# Patient Record
Sex: MALE | Race: WHITE | NOT HISPANIC OR LATINO | Employment: UNEMPLOYED | URBAN - METROPOLITAN AREA
[De-identification: names, ages, dates, MRNs, and addresses within clinical notes are randomized per-mention and may not be internally consistent; named-entity substitution may affect disease eponyms.]

---

## 2017-03-10 ENCOUNTER — MYC MEDICAL ADVICE (OUTPATIENT)
Dept: INTERNAL MEDICINE | Facility: CLINIC | Age: 27
End: 2017-03-10

## 2017-03-10 DIAGNOSIS — R91.1 LUNG NODULE: Primary | ICD-10-CM

## 2017-03-21 ENCOUNTER — HOSPITAL ENCOUNTER (OUTPATIENT)
Dept: GENERAL RADIOLOGY | Facility: CLINIC | Age: 27
Discharge: HOME | End: 2017-03-21
Attending: INTERNAL MEDICINE | Admitting: INTERNAL MEDICINE
Payer: COMMERCIAL

## 2017-03-21 DIAGNOSIS — R91.1 LUNG NODULE: ICD-10-CM

## 2017-03-21 DIAGNOSIS — R91.8 PULMONARY NODULES: Primary | ICD-10-CM

## 2017-03-21 PROCEDURE — 71020 XR CHEST 2 VW: CPT

## 2017-09-22 ASSESSMENT — ENCOUNTER SYMPTOMS
NECK PAIN: 0
NERVOUS/ANXIOUS: 1
BACK PAIN: 1
JOINT SWELLING: 0
MYALGIAS: 0
DEPRESSION: 1
INSOMNIA: 0
PANIC: 0
MUSCLE CRAMPS: 0
ARTHRALGIAS: 0
MUSCLE WEAKNESS: 0
STIFFNESS: 0
DECREASED CONCENTRATION: 1

## 2017-10-06 ENCOUNTER — OFFICE VISIT (OUTPATIENT)
Dept: INTERNAL MEDICINE | Facility: CLINIC | Age: 27
End: 2017-10-06

## 2017-10-06 VITALS
SYSTOLIC BLOOD PRESSURE: 119 MMHG | DIASTOLIC BLOOD PRESSURE: 74 MMHG | HEART RATE: 72 BPM | TEMPERATURE: 97.7 F | BODY MASS INDEX: 33.18 KG/M2 | HEIGHT: 76 IN | WEIGHT: 272.5 LBS | OXYGEN SATURATION: 97 % | RESPIRATION RATE: 18 BRPM

## 2017-10-06 DIAGNOSIS — Z23 NEED FOR PROPHYLACTIC VACCINATION AND INOCULATION AGAINST INFLUENZA: ICD-10-CM

## 2017-10-06 DIAGNOSIS — F41.9 ANXIETY: ICD-10-CM

## 2017-10-06 DIAGNOSIS — Z23 NEED FOR PROPHYLACTIC VACCINATION WITH TETANUS-DIPHTHERIA (TD): ICD-10-CM

## 2017-10-06 DIAGNOSIS — F41.9 ANXIETY: Primary | ICD-10-CM

## 2017-10-06 LAB
ANION GAP SERPL CALCULATED.3IONS-SCNC: 5 MMOL/L (ref 3–14)
BUN SERPL-MCNC: 14 MG/DL (ref 7–30)
CALCIUM SERPL-MCNC: 8.4 MG/DL (ref 8.5–10.1)
CHLORIDE SERPL-SCNC: 106 MMOL/L (ref 94–109)
CO2 SERPL-SCNC: 28 MMOL/L (ref 20–32)
CREAT SERPL-MCNC: 1.11 MG/DL (ref 0.66–1.25)
GFR SERPL CREATININE-BSD FRML MDRD: 79 ML/MIN/1.7M2
GLUCOSE SERPL-MCNC: 104 MG/DL (ref 70–99)
POTASSIUM SERPL-SCNC: 3.9 MMOL/L (ref 3.4–5.3)
SODIUM SERPL-SCNC: 139 MMOL/L (ref 133–144)

## 2017-10-06 RX ORDER — BUPROPION HYDROCHLORIDE 300 MG/1
300 TABLET ORAL DAILY
Qty: 90 TABLET | Refills: 3 | Status: SHIPPED | OUTPATIENT
Start: 2017-10-06 | End: 2018-11-26

## 2017-10-06 ASSESSMENT — PAIN SCALES - GENERAL: PAINLEVEL: NO PAIN (0)

## 2017-10-06 NOTE — NURSING NOTE
Chief Complaint   Patient presents with     Physical     Here for annual physical exam     Stefan Pederson CMA (AAMA) at 8:10 AM on 10/6/2017

## 2017-10-06 NOTE — NURSING NOTE
"Injectable Influenza Immunization Documentation    1.  Has the patient received the information for the injectable influenza vaccine? YES     2. Is the patient 6 months of age or older? YES     3. Does the patient have any of the following contraindications?         Severe allergy to eggs? No     Severe allergic reaction to previous influenza vaccines? No   Severe allergy to latex? No       History of Guillain-[place] syndrome? No     Currently have a temperature greater than 100.4F? No        4.  Severely egg allergic patients should have flu vaccine eligibility assessed by an MD, RN, or pharmacist, and those who received flu vaccine should be observed for 15 min by an MD, RN, Pharmacist, Medical Technician, or member of clinic staff.\": YES    5. Latex-allergic patients should be given latex-free influenza vaccine Yes. Please reference the Vaccine latex table to determine if your clinic s product is latex-containing.       Administered Influenza Fluzone Quadrivalent and TDaP Vaccine (see Immunizations in Chart Review). Patient tolerated well.        Stefan Pederson CMA at 8:35 AM on 10/6/2017       "

## 2017-10-06 NOTE — MR AVS SNAPSHOT
After Visit Summary   10/6/2017    Wicho Morocho    MRN: [id number]           Patient Information     Date Of Birth          1990        Visit Information        Provider Department      10/6/2017 8:00 AM Yarelis Steele MD [facility]        Today's Diagnoses     Anxiety    -  1    Need for prophylactic vaccination and inoculation against influenza        Need for prophylactic vaccination with tetanus-diphtheria (TD)           Follow-ups after your visit        Future tests that were ordered for you today     Open Future Orders        Priority Expected Expires Ordered    Basic metabolic panel Routine 10/6/2017 10/20/2017 10/6/2017            Who to contact     Please call your clinic at [phone] to:    Ask questions about your health    Make or cancel appointments    Discuss your medicines    Learn about your test results    Speak to your doctor   If you have compliments or concerns about an experience at your clinic, or if you wish to file a complaint, please contact [facility] Patient Relations at [phone] or email us at [email or web address]         Additional Information About Your Visit        MyChart Information     Navent gives you secure access to your electronic health record. If you see a primary care provider, you can also send messages to your care team and make appointments. If you have questions, please call your primary care clinic.  If you do not have a primary care provider, please call [phone] and they will assist you.      Navent is an electronic gateway that provides easy, online access to your medical records. With Navent, you can request a clinic appointment, read your test results, renew a prescription or communicate with your care team.     To access your existing account, please contact your [facility] or call [phone] for assistance.        Care EveryWhere  "ID     This is your Care EveryWhere ID. This could be used by other organizations to access your [place] medical records  [id number]        Your Vitals Were     Pulse Temperature Respirations Height Pulse Oximetry BMI (Body Mass Index)    72 97.7  F (36.5  C) (Oral) 18 1.93 m (6' 4\") 97% 33.17 kg/m2       Blood Pressure from Last 3 Encounters:   10/06/17 119/74   02/03/16 124/77   04/23/15 126/72    Weight from Last 3 Encounters:   10/06/17 123.6 kg (272 lb 8 oz)   02/03/16 123.8 kg (273 lb)   10/12/15 117.4 kg (258 lb 14.4 oz)              We Performed the Following     FLU VACCINE, 3 YRS +, IM  [25436]     TDAP ( BOOSTRIX AGES 10-64)          Today's Medication Changes          These changes are accurate as of: 10/6/17  8:26 AM.  If you have any questions, ask your nurse or doctor.               These medicines have changed or have updated prescriptions.        Dose/Directions    buPROPion 300 MG 24 hr tablet   Commonly known as:  WELLBUTRIN XL   This may have changed:    - medication strength  - how to take this   Used for:  Anxiety   Changed by:  Yarelis Steele MD        Dose:  300 mg   Take 1 tablet (300 mg) by mouth daily   Quantity:  90 tablet   Refills:  3            Where to get your medicines      These medications were sent to [facility]/pharmacy #[id number] - [place], [place] - [address] [place] [address] AT [facility]  [address] [place] [address]     Phone:  [phone]     buPROPion 300 MG 24 hr tablet                Primary Care Provider Office Phone # Fax #    Yarelis Steele [phone]       [address] [place] [address] [address] [place] [id number]        Equal Access to Services     [facility] AISHA : Diana Alarcon, waaxda luqadaha, qaybta kaalmada isaac, merritt monique. So [facility] [phone].    ATENCIÓN: Si habla español, tiene a eason disposición servicios gratuitos de asistencia lingüística. Llame al " [phone].    We comply with applicable federal civil rights laws and [place] laws. We do not discriminate on the basis of race, color, national origin, age, disability, sex, sexual orientation, or gender identity.            Thank you!     Thank you for choosing [facility]  for your care. Our goal is always to provide you with excellent care. Hearing back from our patients is one way we can continue to improve our services. Please take a few minutes to complete the written survey that you may receive in the mail after your visit with us. Thank you!             Your Updated Medication List - Protect others around you: Learn how to safely use, store and throw away your medicines at www.disposemymeds.org.          This list is accurate as of: 10/6/17  8:26 AM.  Always use your most recent med list.                   Brand Name Dispense Instructions for use Diagnosis    buPROPion 300 MG 24 hr tablet    WELLBUTRIN XL    90 tablet    Take 1 tablet (300 mg) by mouth daily    Anxiety       TYLENOL PO      Take by mouth as needed for mild pain or fever

## 2017-10-06 NOTE — PROGRESS NOTES
"Chief complaint:  Wicho Morocho is a 27 year old male presents for   Chief Complaint   Patient presents with     Physical     Here for annual physical exam        SUBJECTIVE:  He has been doing well.  Had serial CXRs which showed stable pulmonary nodules, so no further imaging recommended at this point.    His oral prosthesis continues to fit well and has no issues.    He is hoping that I will take over the prescribing of his welbutrin.  Reports his mood is doing well with the current regimen.    Not getting a lot of exercise.  Is hoping to increase this.    Sexually active with his girlfriend who is a 2nd year medical student.    Medications and allergies were reviewed by me today.     SocHx:   History   Smoking Status     Never Smoker   Smokeless Tobacco     Never Used     Comment: Rarely       PHQ-2 ( 1999 Pfizer) 4/23/2015 4/23/2015   Q1: Little interest or pleasure in doing things 1 1   Q2: Feeling down, depressed or hopeless 1 1   PHQ-2 Score 2 2     No flowsheet data found.      Patient Active Problem List   Diagnosis     ADD (attention deficit disorder)     Sarcoma (H)     Pulmonary nodules     Family history of colon cancer     Family history of hemophilia A       Review Of Systems   10 point ROS negative except as per HPI    PE:  /74 (BP Location: Right arm, Patient Position: Chair, Cuff Size: Adult Regular)  Pulse 72  Temp 97.7  F (36.5  C) (Oral)  Resp 18  Ht 1.93 m (6' 4\")  Wt 123.6 kg (272 lb 8 oz)  SpO2 97%  BMI 33.17 kg/m2  Gen: no distress, comfortable, pleasant   Eyes: anicteric, normal extra-ocular movements   Cardiovascular: regular rate and rhythm, normal S1 and S2, no murmurs, rubs or gallops, peripheral pulses full and symmetric   Respiratory: clear to auscultation, no wheezes or crackles, normal breath sounds   Gastrointestinal: positive bowel sounds, nontender, nondistended  Skin: no concerning lesions, no jaundice   Psychological: appropriate mood "       ASSESSMENT/PLAN:    Need for prophylactic vaccination and inoculation against influenza  - FLU VACCINE, 3 YRS +, IM  [76756]    Need for prophylactic vaccination with tetanus-diphtheria (TD)  - TDAP ( BOOSTRIX AGES 10-64)    Anxiety  Will take over prescribing.  Mood has been stable  - buPROPion (WELLBUTRIN XL) 300 MG 24 hr tablet  Dispense: 90 tablet; Refill: 3  - Basic metabolic panel      HM:  As above    RTC: 1 year    Yarelis Steele MD

## 2017-12-27 ENCOUNTER — MYC REFILL (OUTPATIENT)
Dept: INTERNAL MEDICINE | Facility: CLINIC | Age: 27
End: 2017-12-27

## 2017-12-27 DIAGNOSIS — F41.9 ANXIETY: ICD-10-CM

## 2017-12-27 RX ORDER — BUPROPION HYDROCHLORIDE 300 MG/1
300 TABLET ORAL DAILY
Qty: 90 TABLET | Refills: 3 | Status: CANCELLED | OUTPATIENT
Start: 2017-12-27

## 2018-05-02 ENCOUNTER — MYC REFILL (OUTPATIENT)
Dept: INTERNAL MEDICINE | Facility: CLINIC | Age: 28
End: 2018-05-02

## 2018-05-02 DIAGNOSIS — F41.9 ANXIETY: ICD-10-CM

## 2018-05-02 RX ORDER — BUPROPION HYDROCHLORIDE 300 MG/1
300 TABLET ORAL DAILY
Qty: 90 TABLET | Refills: 3 | Status: CANCELLED | OUTPATIENT
Start: 2018-05-02

## 2018-11-26 DIAGNOSIS — F41.9 ANXIETY: ICD-10-CM

## 2018-11-28 RX ORDER — BUPROPION HYDROCHLORIDE 300 MG/1
300 TABLET ORAL DAILY
Qty: 90 TABLET | Refills: 0 | Status: SHIPPED | OUTPATIENT
Start: 2018-11-28 | End: 2023-07-19

## 2018-11-28 NOTE — TELEPHONE ENCOUNTER
Called patient, no answered. Left message with clinic number to call back to schedule annual physical appointment with PCP in the Primary Care Clinic.

## 2018-11-28 NOTE — TELEPHONE ENCOUNTER
Last Clinic Visit: 10/6/17  NV: NONE    90 DAY RF  Scheduling has been notified to contact the pt for appointment.

## 2020-03-10 ENCOUNTER — HEALTH MAINTENANCE LETTER (OUTPATIENT)
Age: 30
End: 2020-03-10

## 2020-12-20 ENCOUNTER — HEALTH MAINTENANCE LETTER (OUTPATIENT)
Age: 30
End: 2020-12-20

## 2021-04-24 ENCOUNTER — HEALTH MAINTENANCE LETTER (OUTPATIENT)
Age: 31
End: 2021-04-24

## 2021-10-03 ENCOUNTER — HEALTH MAINTENANCE LETTER (OUTPATIENT)
Age: 31
End: 2021-10-03

## 2022-05-15 ENCOUNTER — HEALTH MAINTENANCE LETTER (OUTPATIENT)
Age: 32
End: 2022-05-15

## 2022-09-10 ENCOUNTER — HEALTH MAINTENANCE LETTER (OUTPATIENT)
Age: 32
End: 2022-09-10

## 2023-04-30 ENCOUNTER — HEALTH MAINTENANCE LETTER (OUTPATIENT)
Age: 33
End: 2023-04-30

## 2023-07-19 ENCOUNTER — HOSPITAL ENCOUNTER (EMERGENCY)
Facility: CLINIC | Age: 33
Discharge: HOME | End: 2023-07-19
Attending: EMERGENCY MEDICINE | Admitting: EMERGENCY MEDICINE

## 2023-07-19 VITALS
TEMPERATURE: 98.2 F | WEIGHT: 261.5 LBS | DIASTOLIC BLOOD PRESSURE: 69 MMHG | SYSTOLIC BLOOD PRESSURE: 117 MMHG | BODY MASS INDEX: 31.84 KG/M2 | OXYGEN SATURATION: 97 % | HEART RATE: 61 BPM | HEIGHT: 76 IN | RESPIRATION RATE: 18 BRPM

## 2023-07-19 DIAGNOSIS — R45.89 EMOTIONAL DYSREGULATION: ICD-10-CM

## 2023-07-19 DIAGNOSIS — F33.1 MAJOR DEPRESSIVE DISORDER, RECURRENT EPISODE, MODERATE (H): ICD-10-CM

## 2023-07-19 DIAGNOSIS — F41.1 GENERALIZED ANXIETY DISORDER: ICD-10-CM

## 2023-07-19 PROCEDURE — 90791 PSYCH DIAGNOSTIC EVALUATION: CPT

## 2023-07-19 PROCEDURE — 99285 EMERGENCY DEPT VISIT HI MDM: CPT | Mod: 25

## 2023-07-19 PROCEDURE — 250N000013 HC RX MED GY IP 250 OP 250 PS 637: Performed by: EMERGENCY MEDICINE

## 2023-07-19 PROCEDURE — 99284 EMERGENCY DEPT VISIT MOD MDM: CPT | Performed by: NURSE PRACTITIONER

## 2023-07-19 RX ORDER — CITALOPRAM HYDROBROMIDE 40 MG/1
40 TABLET ORAL DAILY
Qty: 30 TABLET | Refills: 0 | Status: SHIPPED | OUTPATIENT
Start: 2023-07-19 | End: 2023-12-21

## 2023-07-19 RX ORDER — CITALOPRAM HYDROBROMIDE 20 MG/1
20 TABLET ORAL DAILY
COMMUNITY
Start: 2022-12-06 | End: 2023-07-19

## 2023-07-19 RX ORDER — BUPROPION HYDROCHLORIDE 150 MG/1
150 TABLET ORAL EVERY MORNING
COMMUNITY
End: 2023-10-13

## 2023-07-19 RX ORDER — DIAZEPAM 5 MG
5 TABLET ORAL ONCE
Status: COMPLETED | OUTPATIENT
Start: 2023-07-19 | End: 2023-07-19

## 2023-07-19 RX ADMIN — DIAZEPAM 5 MG: 5 TABLET ORAL at 19:03

## 2023-07-19 ASSESSMENT — COLUMBIA-SUICIDE SEVERITY RATING SCALE - C-SSRS
TOTAL  NUMBER OF PREPARATORY ACTS PAST 3 MONTHS: 1
TOTAL  NUMBER OF PREPARATORY ACTS LIFETIME: 1
6. HAVE YOU EVER DONE ANYTHING, STARTED TO DO ANYTHING, OR PREPARED TO DO ANYTHING TO END YOUR LIFE?: YES
1. IN YOUR LIFETIME, HAVE YOU WISHED YOU WERE DEAD OR WISHED YOU COULD GO TO SLEEP AND NOT WAKE UP?: PASSIVE THOUGHTS
1. IN THE PAST MONTH, HAVE YOU WISHED YOU WERE DEAD OR WISHED YOU COULD GO TO SLEEP AND NOT WAKE UP?: PASSIVE THOUGHTS
TOTAL  NUMBER OF ABORTED OR SELF INTERRUPTED ATTEMPTS LIFETIME: NO
TOTAL  NUMBER OF INTERRUPTED ATTEMPTS LIFETIME: NO
2. HAVE YOU ACTUALLY HAD ANY THOUGHTS OF KILLING YOURSELF?: NO
6. HAVE YOU EVER DONE ANYTHING, STARTED TO DO ANYTHING, OR PREPARED TO DO ANYTHING TO END YOUR LIFE?: YES
ATTEMPT LIFETIME: NO
REASONS FOR IDEATION PAST MONTH: EQUALLY TO GET ATTENTION, REVENGE, OR A REACTION FROM OTHERS AND TO END/STOP THE PAIN
1. HAVE YOU WISHED YOU WERE DEAD OR WISHED YOU COULD GO TO SLEEP AND NOT WAKE UP?: YES
1. IN THE PAST MONTH, HAVE YOU WISHED YOU WERE DEAD OR WISHED YOU COULD GO TO SLEEP AND NOT WAKE UP?: YES
REASONS FOR IDEATION LIFETIME: EQUALLY TO GET ATTENTION, REVENGE, OR A REACTION FROM OTHERS AND TO END/STOP THE PAIN

## 2023-07-19 ASSESSMENT — ACTIVITIES OF DAILY LIVING (ADL)
ADLS_ACUITY_SCORE: 35
ADLS_ACUITY_SCORE: 35

## 2023-07-19 NOTE — ED PROVIDER NOTES
History     Chief Complaint:  Suicidal ideation     HPI   Wicho Morocho is a 32 year old male with a history of depression who presents for suicidal ideation. He was on the roof of a parking ramp when he called his wife to tell her that he was going to jump off. He has been under increased stress lately, and is not currently seeing a psychiatrist or therapist. He is still taking his psych medications. He denies self-harm, drug use, or alcohol use.      Independent Historian:   None - Patient Only    Review of External Notes:   I reviewed previous pulmonology and family medicine notes.      Medications:    Wellbutrin  Prozac  Celexa    Past Medical History:    ROBERT  Pre-diabetes  Depression  Anxiety  Fatty liver  Hypertriglyceridemia  ADD  Osteosarcoma    Past Surgical History:    Osteotomy, Maxilla, Left  Intraoral lesion excision  [place] teeth extraction     Physical Exam     Patient Vitals for the past 24 hrs:   BP Temp Temp src Pulse Resp SpO2   07/19/23 1820 (!) 139/91 (!) 96.7  F (35.9  C) Temporal 92 16 98 %        Physical Exam  General: Alert, interactive in mild distress  Head:  Scalp is atraumatic  Eyes:  The pupils are equal, round, and reactive to light    EOM's intact    No scleral icterus  ENT:      Nose:  The external nose is normal  Ears:  External ears are normal  Mouth/Throat: The oropharynx is normal    Mucus membranes are moist.      Neck:  Normal range of motion.      There is no rigidity.    Trachea is in the midline         CV:  Regular rate and rhythm    No murmur   Resp:  Breath sounds are clear bilaterally    Non-labored, no retractions or accessory muscle use      GI:  Abdomen is soft, no distension, no tenderness.       MS:  Normal strength in all 4 extremities, No midline cervical, thoracic, or lumbar tenderness  Skin:  Warm and dry, No rash or lesions noted.  Neuro:      Strength 5/5 x4.  Sensation intact  In all 4 extremities.       Cranial nerves 2-12 intact.    GCS:  15  Psych:  Awake. Alert.  Normal affect.      Appropriate interactions.    Emergency Department Course     Interventions:  Medications   diazepam (VALIUM) tablet 5 mg (has no administration in time range)        Assessments:  1850 I obtained history and examined the patient, as noted above.    Independent Interpretation (X-rays, CTs, rhythm strip):  None    Consultations/Discussion of Management or Tests:  The patient will be evaluated by our mental health team in our empath unit    Social Determinants of Health affecting care:   Stress/Adjustment Disorders    Disposition:  The patient was transferred to [facility].     Impression & Plan      Medical Decision Making:  Following presentation history and physical examination were performed, patient presents with an acute stress response, depression and suicidal ideation.  He has no chronic underlying medical conditions, there is no signs of an acute ingestion or toxidrome.  I believe he would benefit from further evaluation by her mental health professionals in our empath unit and I find him to be medically stable for further evaluation in this unit.  Patient is in agreement this plan of action.    Diagnosis:    ICD-10-CM    1. Depression with suicidal ideation  F32.A     R45.851            Scribe Disclosure:  I, Dano Retana, am serving as a scribe at 6:43 PM on 7/19/2023 to document services personally performed by Gavin Robles MD based on my observations and the provider's statements to me.      Gavin Robles MD  07/20/23 [id number]

## 2023-07-19 NOTE — ED TRIAGE NOTES
Pt from [place], recently moved here. Called his wife, states he is suicidal and was on the top of [facility] parking ramp and wanted to jump. Pt wife called dominique REAL found pt in his car on the top of [facility] parking ramp facing [address]. Pt sobbing, cooperative

## 2023-07-20 NOTE — CONSULTS
"Diagnostic Evaluation Consultation  Crisis Assessment    Patient was assessed: In Person  Patient location: declocations: EMPATH  Was a release of information signed: No. Reason: no providers to list, but pt signed a verbal LAYLA for wife      Referral Data and Chief Complaint  Wicho is a 32 year old, who uses he/him pronouns, and presents to the ED via police. Patient is referred to the ED by self. Patient is presenting to the ED for the following concerns: panic attack.      Informed Consent and Assessment Methods     Patient is his own guardian. Writer met with patient and explained the crisis assessment process, including applicable information disclosures and limits to confidentiality, assessed understanding of the process, and obtained consent to proceed with the assessment. Patient was observed to be able to participate in the assessment as evidenced by consent and engagement. Assessment methods included conducting a formal interview with patient, review of medical records, collaboration with medical staff, and obtaining relevant collateral information from family and community providers when available..     Over the course of this crisis assessment provided reassurance, offered validation, engaged patient in problem solving and disposition planning, worked with patient on safety and aftercare planning, assisted in processing patient's thoughts and feeling relating to stressors and provided psychoeducation. Patient's response to interventions was receptive.     Summary of Patient Situation  Patient stated that he was at his in-laws' house earlier today when he had an argument with his wife. He stated that he has been feel stressed out by family for the last four months. He stated that he \"couldn't take it anymore\" and \"stormed out of the house.\" He stated that he intended to be seen at this ED for his \"panic attack\" and because he \"wanted to talk to someone.\" However, once he got to the parking ramp, he \"felt " "embarassed.\" He had texted his wife from the parking ramp questing if he should jump. His wife called the police, and patient was brought to the ED for a psychiatric evaluation.    At the time of assessment, patient denied active or recent suicidal ideation Pt denied having a plan or intent to attempt suicide. He stated that he panicked due to feeling overwhelmed with stressors at home. He did endorse passive SI for the last \"few years,\" which he described as thoughts such as \"it would be nice to make it all go away.\"       Brief Psychosocial History  Patient is originally from [place], but has moved around for the past eight years for his wife's career. They recently moved back to [place] two weeks ago from [place], where they lived for three years. Pt lives at home with his wife and their 17 month old son, Luis Miguel. He stated that he has friends and family in the state and feels good to be back. However, he stated that he has been feeling \"out of place.\" Pt stated that his wife is a doctor that would soon start working at this ED. He reported a \"reginald relationship\" with his wife for the last 3 months as she is stressed out as well. 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 Pt earned his Bachelor's degree in computer science. He has not worked in the last 3 years.  He enjoys playing video games and board games.     Significant Clinical History  Patient reported previous mental health diagnoses of depression and anxiety. He denied history of inpatient hospitalizations. He used to see a psychiatrist in [place], but has not established a provider in [place] yet due to lack of insurance. He currently takes Celexa and Wellbutrin as prescribed. He stated that he has not had medication adjustments \"for a few years.\" Pt reported a history of therapy, but not currently. He stated that he plans to begin marriage counseling soon. He stated that he is not interested in individual psychotherapy at this " "time, stating, \"I don't like talking about myself.\" Pt reported a history of marijuana used to self-medicate, but denies recent use.      Collateral Information  The following information was received from Gina Rivera whose relationship to the patient is spouse. Information was obtained via phone. Their phone number is  and they last had contact with patient on today.     What happened today: She reports that today they got into a small what seemed typical marital argument and he got really angry and left the house.  He began sending text messages to her, over 50 of them in 2 min and then pictures of the top of the parking ramp at [facility] asking if he should jump.  She called PD.     What is different about patient's functioning: She notes that he has a long hx of depression and anxiety.  She denies any previous hospitalizations, IOP/PHP.  She reports that she just completed medical school/residancy and they moved back to [place] from [place].  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  She reports that its been several years since she has seen mental health providers as he is \"not a talker\".  She reports that he has been getting medication support from PCP.  She reports that lately he has seemed more depressed the last couple of months.  She wonders if this is more than anxiety/social anxiety; if possible ASD.     Concern about alcohol/drug use: No  Remote hx of THC.     What do you think the patient needs: medication adjustment, resources     Has patient made comments about wanting to kill themselves/others:  Yes She reports that during anger outbursts he has made suicidal statements but typically calms.  She denies any previous gestures/attempts/self harm.  Denies any access to guns.     If d/c is recommended, can they take part in safety/aftercare planning: Yes .     Other information: She reports that he is not currently covered under " insurance but they will cash pay until their new insurance begins likely 9/1           Domonique Thomas [facility]         Risk Assessment  [place] Suicide Severity Rating Scale Full Clinical Version:7/19/2023  Suicidal Ideation  1. Wish to be Dead (Lifetime): Yes  Wish to be Dead Description (Lifetime): passive thoughts  1. Wish to be Dead (Past 1 Month): Yes  Wish to be Dead Description (Past 1 Month): passive thoughts  2. Non-Specific Active Suicidal Thoughts (Lifetime): No  Intensity of Ideation  Most Severe Ideation Rating (Lifetime): 1  Description of Most Severe Ideation (Lifetime): passive thoughts  Most Severe Ideation Rating (Past 1 Month): 1  Description of Most Severe Ideation (Past 1 Month): passive thoughts  Frequency (Lifetime): Less than once a week  Frequency (Past 1 Month): Less than once a week  Duration (Lifetime): Fleeting, few seconds or minutes  Duration (Past 1 Month): Fleeting, few seconds or minutes  Controllability (Lifetime): Easily able to control thoughts  Controllability (Past 1 Month): Can control thoughts with little difficulty  Deterrents (Lifetime): Deterrents definitely stopped you from attempting suicide  Deterrents (Past 1 Month): Deterrents definitely stopped you from attempting suicide  Reasons for Ideation (Lifetime): Equally to get attention, revenge, or a reaction from others and to end/stop the pain  Reasons for Ideation (Past 1 Month): Equally to get attention, revenge, or a reaction from others and to end/stop the pain  Suicidal Behavior  Actual Attempt (Lifetime): No  Has subject engaged in non-suicidal self-injurious behavior? (Lifetime): No  Interrupted Attempts (Lifetime): No  Aborted or Self-Interrupted Attempt (Lifetime): No  Preparatory Acts or Behavior (Lifetime): Yes  Total Number of Preparatory Acts (Lifetime): 1  Preparatory Acts or Behavior (Past 3 Months): Yes  Total Number of Preparatory Acts (Past 3 Months): 1  Preparatory Acts or Behavior Description (Past 3  "Months): texts about jumping off a parking ramp to his wife  C-SSRS Risk (Lifetime/Recent)  Calculated C-SSRS Risk Score (Lifetime/Recent): High Risk        Validity of evaluation is not impacted by presenting factors during interview .   Comments regarding subjective versus objective responses to [place] tool: Pt is mood congruent.  Environmental or Psychosocial Events: loss of status/respect/rank, challenging interpersonal relationships, barriers to accessing healthcare, impulsivity/recklessness, unemployment/underemployment, neither working nor attending school and recent life events: recent move to [place]  Chronic Risk Factors: other: emotion dysregulation   Warning Signs: talking or writing about death, dying, or suicide, withdrawing from friends, family, and society and anxiety, agitation, unable to sleep, sleeping all the time  Protective Factors: strong bond to family unit, community support, or employment, responsibilities and duties to others, including pets and children, lives in a responsibly safe and stable environment, sense of importance of health and wellness, help seeking, optimistic outlook - identification of future goals, constructive use of leisure time, enjoyable activities, resilience and reality testing ability  Interpretation of Risk Scoring, Risk Mitigation Interventions and Safety Plan:  Patient has a high risk score due to recent Preparatory Acts or Behavior. At the time of assessment, patient denied active or recent suicidal ideation. Pt denied having a plan or intent to attempt suicide. He stated that he panicked due to feeling overwhelmed with stressors at home. He did endorse passive SI for the last \"few years,\" which he described as thoughts such as \"it would be nice to make it all go away.\" Further risk would be mitigated by following up with a therapist and medication provider.            Does the patient have thoughts of harming others? No     Is the patient engaging in sexually " "inappropriate behavior?  no        Current Substance Abuse     Is there recent substance abuse? no     Was a urine drug screen or blood alcohol level obtained: No       Mental Status Exam     Affect: Constricted   Appearance: Appropriate    Attention Span/Concentration: Attentive  Eye Contact: Variable   Fund of Knowledge: Appropriate    Language /Speech Content: Fluent   Language /Speech Volume: Normal    Language /Speech Rate/Productions: Normal    Recent Memory: Intact   Remote Memory: Intact   Mood: Anxious    Orientation to Person: Yes    Orientation to Place: Yes   Orientation to Time of Day: Yes    Orientation to Date: Yes    Situation (Do they understand why they are here?): Yes    Psychomotor Behavior: Normal    Thought Content: Clear   Thought Form: Goal Directed      History of commitment: No         Medication    Psychotropic medications: Yes. Pt is currently taking Celexa and Wellbutrin. Medication compliant: Yes. Recent medication changes: No     No current facility-administered medications for this encounter.     Current Outpatient Medications   Medication     buPROPion (WELLBUTRIN XL) 150 MG 24 hr tablet     citalopram (CELEXA) 40 MG tablet     Acetaminophen (TYLENOL PO)          Current Care Team    Primary Care Provider: No  Psychiatrist: No  Therapist: No  : No     CTSS or ARMHS: No  ACT Team: No  Other: No      Diagnosis    300.00 (F41.9) Unspecified Anxiety Disorder   311 (F32.9) Unspecified Depressive Disorder  - by history       Clinical Summary and Substantiation of Recommendations    He stated that he intended to be seen at this ED for his \"panic attack\" and because he \"wanted to talk to someone\" about psychosocial stressors. However, once he got to the parking ramp, he \"felt embarassed.\" He had texted his wife from the parking ramp questing if he should jump. His wife called the police, and patient was brought to the ED for a psychiatric evaluation.  Patient has a high risk " "score due to recent Preparatory Acts or Behavior. At the time of assessment, patient denied active or recent suicidal ideation. Pt denied having a plan or intent to attempt suicide. He stated that he panicked due to feeling overwhelmed with stressors at home. He did endorse passive SI for the last \"few years,\" which he described as thoughts such as \"it would be nice to make it all go away.\" Further risk would be mitigated by following up with a therapist and medication provider. Pt was given OP resources as he does not have insurance yet.     Disposition    Recommended disposition: Individual Therapy, Family Therapy and Medication Management       Reviewed case and recommendations with attending provider. Attending Name: Wilton Peralta CNP       Attending concurs with disposition: Yes       Patient and/or validated legal guardian concurs with disposition: Yes       Final disposition: Individual therapy , Family therapy  and Medication management.       Outpatient Details (if applicable):   Aftercare plan and appointments placed in the AVS and provided to patient: Yes. Given to patient by ARIELLE Lyons    Was lethal means counseling provided as a part of aftercare planning? No guns or weapons at home;       Assessment Details    Patient interview started at: 8:00pm and completed at: 8:30pm.     Total time spent with the patient or their family: .50 hrs      CPT code(s) utilized: 17199 - Psychotherapy for Crisis - 60 (30-74*) min       CHOCO Bran, BOLA, Psychotherapist  [facility] - Triage & Transition Services  Callback: [phone]                Please know that you may contact your insurance carrier member services to learn more about scheduling in network therapy. Your insurance company will have lists of in network therapists that are not within [facility] and may have more immediate availability.     Get Care started with an ongoing therapist by calling to schedule your intake at one of the following " clinics:    [facility]: [phone]  [facility]  [phone]  [facility] [phone]  [facility] [phone]  [facility] [phone]  [facility]  [phone]   [facility] [phone]    You may contact the Transition Clinic for brief short term support with your mental health goals. Call [phone] for more information.           Dialectical Behavior Therapy (DBT) is evidence based comprehensive treatment delivered via three modalities; individual therapy, group skills training, and telephone coaching by a team of DBT-trained providers. Team members meet 90 minutes per week as part of a DBT-specific consultation team. DBT treatment is based in cognitive, behavioral and dialectic principles and incorporates both clinical and rehabilitative interventions. The targeted treatment group is individuals for whom empirical evidence supports its effectiveness; individuals who are suicidal, engaging in self-harm behaviors, and/or diagnosed with a borderline personality disorder.    Adult DBT (only)    [facility] [address] [facility] [phone]    [facility] [address] [facility] [phone]    [facility] [address] [facility] [phone]    Adolescent OR Adult DBT services    [facility] [address] [place] [phone].  Also specializes in DD/TBI emotional regulation     [facility] (numerous locations, adolescent DBT is only in [facility]) [phone]    [facility] [address] (numerous locations) [phone]    DBT & EMDR Specialist located in both [place] and [place] [phone]    [facility] [address] [phone]    [facility] [address] [phone]    [facility] [address] [facility]  [phone]    [facility] [address] [place] [phone]    [facility] [address] [facility] [phone], also has groups specifically for eating disorders DBT, GRISEL DBT, and LGBTQ DBT    [facility] [address] [facility] [phone]    [facility] (numerous locations) [phone]    [facility] [address] [facility] [phone]              Aftercare Plan      If I am feeling unsafe or I am in a crisis, I will:   Contact my established care providers   Call the National Suicide Prevention Lifeline: 881.578.8120   Go to the nearest emergency room   Call [phone]     Warning signs that I or other people might notice when a crisis is developing for me: changes to sleep, appetite or mood, increased anger, agitation or irritability, feeling depressed or hopeless, spending more time alone or talking less, increased crying, decreased productivity, seeing or hearing things that aren't there, thoughts of not wanting to live anymore or of actually killing myself, thoughts of hurting others    Things I am able to do on my own to cope or help me feel better: watching a favorite tv show or movie, listening to music I enjoy, going outside and breathing fresh air, going for a walk or exercising, taking a shower or bath, a cold or hot beverage, a healthy snack, drawing/coloring/painting, journaling, singing or dancing, deep breathing     I can try practicing square breathing when I begin to feel anxious - inhale through the nose for the count of 4 and the first line on the square. Exhale through the mouth for the count of 4 for the second line of the square. Repeat to complete the square. Repeat the square as many times as needed.    I can also use my five senses to practice mindfulness and grounding. What are five things I can see, four things I can hear, three things I can feel, two things I can smell, and one thing I  can taste.     Things that I am able to do with others to cope or help me feel better: sometimes just talking or spending time with someone else, sharing a meal or having coffee, watching a movie or playing a game, going for a walk or exercising    I can also use community resources including mental health hotlines, [facility] crisis teams, or apps.     Things I can use or do for distraction: movies/tv, music, reading, games, drawing/coloring/painting or other art, essential oils, exercise, cleaning/organizing, puzzles, crossword puzzles, word search, Sudoku       I can also download a meditation or relaxation joce, like Calm, Headspace, or Insight Timer (all three offer a free version)    Changes I can make to support my mental health and wellness: Attend scheduled mental health therapy and psychiatric appointments. Take my medications as prescribed. Maintain a daily schedule/routine. Abstain from all mood altering substances, including drugs, alcohol, or medications not currently prescribed to me. Implement a self-care routine.      People in my life that I can ask for help: friends or family, trusted teachers/staff/colleagues, trusted members of my community or place of Mandaeism, mental health crisis lines, or 911    Your [facility] has a mental health crisis team you can call 24/7: [facility], [phone]    Other things that are important when I m in crisis: to remember that the feelings I am having right now are temporary, and it won't feel like this forever, and that it is okay and important to ask for help    Crisis Lines  Crisis Text Line  Text 489217  You will be connected with a trained live crisis counselor to provide support.    Por espanol, texto  UNIQUE a 356143 o texto a 442-AYUDAME en WhatsApp    [place] Hope Line  1.800.SUICIDE [7180779]      Community Resources  Fast Tracker  Linking people to mental health and substance use disorder resources  Kayse Wirelessn.org     [facility]  "Warm Line  Peer to peer support  Monday thru Saturday, 12 pm to 10 pm  315.870.7594 or 8.161.085.3346  Text \"Support\" to 79285    National [place] on Mental Illness (SHIRLEY)  654.473.8192 or 1.888.SHIRLEY.HELPS      Mental Health Apps  My3  https://eoSemipp.org/    VirtualHopeBox  https://LabNow/apps/virtual-hope-box/      Additional Information  Today you were seen by a licensed mental health professional through Triage and Transition services, [facility] ([facility])  for a crisis assessment in the Emergency Department at [facility].  It is recommended that you follow up with your established providers (psychiatrist, mental health therapist, and/or primary care doctor - as relevant) as soon as possible. Coordinators from [facility] will be calling you in the next 24-48 hours to ensure that you have the resources you need.  You can also contact [facility] coordinators directly at [phone]. You may have been scheduled for or offered an appointment with a mental health provider. [facility] maintains an extensive network of licensed behavioral health providers to connect patients with the services they need.  We do not charge providers a fee to participate in our referral network.  We match patients with providers based on a patient's specific needs, insurance coverage, and location.  Our first effort will be to refer you to a provider within your care system, and will utilize providers outside your care system as needed.          "

## 2023-07-20 NOTE — PROGRESS NOTES
"The following information was received from Gina Rivera whose relationship to the patient is spouse. Information was obtained via phone. Their phone number is  and they last had contact with patient on today.    What happened today: She reports that today they got into a small what seemed typical marital argument and he got really angry and left the house.  He began sending text messages to her, over 50 of them in 2 min and then pictures of the top of the parking ramp at [facility] asking if he should jump.  She called PD.    What is different about patient's functioning: She notes that he has a long hx of depression and anxiety.  She denies any previous hospitalizations, IOP/PHP.  She reports that she just completed medical school/residancy and they moved back to [place] from [place].  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  She reports that its been several years since she has seen mental health providers as he is \"not a talker\".  She reports that he has been getting medication support from PCP.  She reports that lately he has seemed more depressed the last couple of months.  She wonders if this is more than anxiety/social anxiety; if possible ASD.    Concern about alcohol/drug use: No  Remote hx of THC.    What do you think the patient needs: medication adjustment, resources    Has patient made comments about wanting to kill themselves/others:  Yes She reports that during anger outbursts he has made suicidal statements but typically calms.  She denies any previous gestures/attempts/self harm.  Denies any access to guns.    If d/c is recommended, can they take part in safety/aftercare planning: Yes .    Other information: She reports that he is not currently covered under insurance but they will cash pay until their new insurance begins likely 9/1        "

## 2023-07-20 NOTE — ED PROVIDER NOTES
"[facility] Unit - Psychiatric Consultation  [facility] Emergency Department    Wicho Morocho MRN: [id number]   Age: 32 year old YOB: 1990     History     Chief Complaint   Patient presents with     Psychiatric Evaluation     Suicidal     HPI  Wicho Morocho is a 32 year old male with history notable for depression, anxiety, and obstructive sleep apnea. Patient presented to the emergency department after being found by police at the top of the [facility] parking ramp. Patient had texted his wife from the parking ramp \"should I jump?\" Wife notified police. Patient was medically evaluated in the emergency department and deemed medically stable for transfer to [facility] for further psychiatric assessment.     Here at [facility], patient describes that he and his wife went to their in-law's house today. Upon arrival, patient reports his wife said something to him that was \"triggering.\" Patient became emotionally dysregulated and needed to leave the situation, so ended up driving to the hospital. He contemplated coming into the emergency room as he felt as though he was having a panic attack. Once he arrived to the parking garage, he felt embarrassed about going in. He began sending text messages to his wife. Per wife's collateral, he sent pictures of the parking garage with the statement \"should I jump.\" Patient has history of passive suicidal ideation but no history of plans, intent, attempts, or self-injury. Patient reports that he was already feeling on-edge and irritable before he felt triggered by his wife's statements. He reports feeling increasingly stressed and irritable over the last 4 months. He and wife just moved here a couple weeks ago from [place]. The move has been stressful. Reports his wife has been stressed out about the move and about starting a new job. Their communication has been quite negative recently, often resulting in an argument. Patient reports he has a history of coping with alcohol " and cannabis. Denies currently using either substance. He also tends to eat more when feeling stressed. Sleep has improved recently with the use of a CPAP. He is currently prescribed citalopram and bupropion, which he has been taking for about the last 4 years. He does feel that they've helped reduce symptoms of anxiety and depression. He discusses struggling with intermittent episodes of agitation and emotional dysregulation. Reports that he experienced this as a child and teen, would lash out, push over book cases, etc. He and wife have wondered about the possibility of an autism spectrum disorder. Ultimately, patient would like to return home this evening and wife feels safe with him returning home. He adamantly denies any thoughts, plans, or intent to harm himself.     Past Medical History  Past Medical History:   Diagnosis Date     NO ACTIVE PROBLEMS      Past Surgical History:   Procedure Laterality Date     BIOPSY       EXCISE LESION INTRAORAL Left 10/21/2014    Procedure: EXCISE LESION INTRAORAL;  Surgeon: Cj Brenner MD;  Location: [facility] OR     EXTRACTION(S) DENTAL N/A 10/21/2014    Procedure: EXTRACTION(S) DENTAL;  Surgeon: Mey Helm DDS;  Location: [facility] OR     OSTEOTOMY MAXILLA Left 11/14/2014    Procedure: OSTEOTOMY MAXILLA;  Surgeon: Cj Brenner MD;  Location: [facility] OR     buPROPion (WELLBUTRIN XL) 150 MG 24 hr tablet  citalopram (CELEXA) 40 MG tablet  Acetaminophen (TYLENOL PO)      No Known Allergies  Family History  Family History   Problem Relation Age of Onset     Heart Disease Paternal Grandfather         dec 55     Diabetes Paternal Grandfather      Colon Cancer Paternal Grandfather         ~?45     Bleeding Disorder Maternal Grandfather         VWD     Diabetes Other         maternal Aunt/ Uncle     Hypertension Maternal Grandmother      Cancer Maternal Grandmother         Osteosarcoma     Bleeding Disorder Maternal Aunt         Hemophilia A (factor VIII)     Bleeding Disorder  "Maternal Uncle         Hemophilia A (factor VIII)     Social History   Social History     Tobacco Use     Smoking status: Never     Smokeless tobacco: Never     Tobacco comments:     Rarely    Substance Use Topics     Alcohol use: Yes     Alcohol/week: 0.0 standard drinks of alcohol     Comment: occasional      Drug use: No     Comment: Heavy maryjuana use for 4 years 2 years ago.       Past medical history, past surgical history, medications, allergies, family history, and social history were reviewed with the patient. No additional pertinent items.       Review of Systems  A medically appropriate review of systems was performed with pertinent positives and negatives noted in the HPI, and all other systems negative.    Physical Examination   BP: (!) 139/91  Pulse: 92  Temp: (!) 96.7  F (35.9  C)  Resp: 16  Height: 193 cm (6' 4\")  Weight: 118.6 kg (261 lb 8 oz)  SpO2: 98 %    Physical Exam  General: Appears stated age.   Neuro: Alert and fully oriented. Extremities appear to demonstrate normal strength on visual inspection.   Integumentary/Skin: no rash visualized, normal color    Psychiatric Examination   Appearance: awake, alert, adequately groomed, dressed in hospital scrubs and appeared as age stated  Attitude:  cooperative  Eye Contact:  fair  Mood:  okay  Affect:  mood congruent and intensity is blunted  Speech:  clear, coherent  Psychomotor Behavior:  no evidence of tardive dyskinesia, dystonia, or tics and fidgeting  Thought Process:  linear and goal oriented  Associations:  no loose associations  Thought Content:  no evidence of psychotic thought and denies thoughts, plans, or intent to harm himself or others  Insight:  fair  Judgement:  fair  Oriented to:  time, person, and place  Attention Span and Concentration:  intact  Recent and Remote Memory:  intact  Language: able to name/identify objects without impairment  Fund of Knowledge: intact with awareness of current and past events    ED Course    "     Labs Ordered and Resulted from Time of ED Arrival to Time of ED Departure - No data to display    Assessments & Plan (with Medical Decision Making)   Patient presenting with suicidal ideation after being found at the top of the hospital parking garage after an argument with his wife. Hx of anxiety and depression on citalopram and bupropion. No prior suicide attempts or self-injury. Nursing notes reviewed noting no acute issues.     I have reviewed the assessment completed by the [facility].     Preliminary diagnosis:    ICD-10-CM    1. Generalized anxiety disorder  F41.1       2. Emotional dysregulation  R45.89       3. Major depressive disorder, recurrent episode, moderate (H)  F33.1            Treatment Plan:  - Increase citalopram to 40 mg daily to further target symptoms of anxiety and depression    - Continue Wellbutrin  mg daily for depression augmentation     - If increasing citalopram is ineffective, could consider augmenting with aripiprazole and discontinuing bupropion in the outpatient setting.     - Patient does not have active insurance. He will be provided with resources to schedule follow-up for medication management and individual psychotherapy. Patient may benefit from DBT therapy to learn emotional regulation and distress tolerance skills. He and wife would benefit from couple's therapy.     - Discussed several coping strategies and relaxation techniques to utilize when feeling emotionally dysregulated.     - Patient denies suicidal thoughts, plans, or intent. He will be discharged home this evening.       After a period of working with the treatment team on the [facility] unit, the patient's mental state improved to allow a safe transition to outpatient care. After counseling on the diagnosis, work-up, and treatment plan, the patient was discharged. Close follow-up with a psychiatrist and/or therapist was recommended and community psychiatric resources were provided. Patient is to return to the ED  if any urgent or potentially life-threatening concerns.     At the time of discharge, the patient's acute suicide risk was determined to be low due to the following factors: Reduction in the intensity of mood/anxiety symptoms that preceded the admission, denial of suicidal thoughts, denies feeling helpless or helpless, not currently under the influence of alcohol or illicit substances, denies experiencing command hallucinations, no immediate access to firearms. The patient's acute risk could be higher if noncompliant with their treatment plan, medications, follow-up appointments or using illicit substances or alcohol. Protective factors include: social supports, children, stable housing      --  Wilton Peralta CNP   [facility] EMERGENCY DEPT  [facility] Unit     Wilton Peralta CNP  07/19/23 [id number]

## 2023-07-20 NOTE — ED NOTES
Pt  calm and cooperative he was transferred to [facility] accompanied by x  2 [facility] clinicians and his wife. All his personnel belongings accompanied pt.

## 2023-07-20 NOTE — ED NOTES
"32 year old male received from ED due to increased anxiety, depression, and suicidal ideation with plan to jump off parking ramp. Reports he has dealt with anxiety for a long time, and has felt increasingly anxious and depressed. States today he got into an argument with his significant other, which triggered him. Denies that he is suicidal, stated \"it was more of a cry for help.\" According to report from ED RN, patient was on the [facility] parking ramp and called his wife and told her he wanted to jump. Patient's wife called PD and he was brought to ED. Patient on CYRUS. Patient currently takes Wellbutrin 150 mg and Celexa 20 mg. Per patient's significant other, patient has tried Prozac and Lexapro in the past, but did not like them. Denies alcohol or drug use. Cooperative with intake process. Guarded in conversation.     Nursing and risk assessments completed. Assessments reviewed with LMHP and physician. Admission information reviewed with patient. Patient given a tour of [facility] and instructions on using the facility. Questions regarding [facility] addressed. Pt safety search completed.     "

## 2023-07-20 NOTE — ED NOTES
Patient agreeable to discharge plan. Discharge instructions reviewed with patient including follow-up care plan. Medications: citalopram increased to 40 mg, citalopram prescribed to patient's preferred outpatient pharmacy. Reviewed safety plan and outpatient resources. Denies SI and HI. All belongings that were brought into the hospital have been returned to patient. Escorted off the unit at 2140 accompanied by [facility] staff. Discharged to home via ride from wife.

## 2023-07-20 NOTE — DISCHARGE INSTRUCTIONS
Please know that you may contact your insurance carrier member services to learn more about scheduling in network therapy. Your insurance company will have lists of in network therapists that are not within [place] and may have more immediate availability.     Get Care started with an ongoing therapist by calling to schedule your intake at one of the following clinics:    [facility]: [phone]  [facility]  [phone]  [facility] [phone]  [facility] [phone]  [facility] [phone]  [facility] system  [phone]   [facility] in [facility] [phone]    You may contact the Transition Clinic for brief short term support with your mental health goals. Call [phone] for more information.           Dialectical Behavior Therapy (DBT) is evidence based comprehensive treatment delivered via three modalities; individual therapy, group skills training, and telephone coaching by a team of DBT-trained providers. Team members meet 90 minutes per week as part of a DBT-specific consultation team. DBT treatment is based in cognitive, behavioral and dialectic principles and incorporates both clinical and rehabilitative interventions. The targeted treatment group is individuals for whom empirical evidence supports its effectiveness; individuals who are suicidal, engaging in self-harm behaviors, and/or diagnosed with a borderline personality disorder.    Adult DBT (only)  [facility] [address] [facility] [phone]  [facility] [address] [facility] [phone]  [facility] [address] [facility] [phone]    Adolescent OR Adult DBT services  [facility] [address] [place] [phone].  Also specializes in DD/TBI emotional regulation   [facility] (numerous locations, adolescent DBT is only in [facility])  [phone]  [facility] [address] (numerous locations) [phone]  [facility] EMDR Specialist located in both [place] and [place] [phone]  [facility] [address] [address] [place] [phone]  [facility] [address] [phone]  [facility] [address] [phone]  [address] [place] [phone]  [facility] [address] [facility]] [phone], also has groups specifically for eating disorders DBT, GRISEL DBT, and LGBTQ DBT  [facility] [address] [phone]  [facility] (numerous locations) [phone]  [facility] [address] [facility]3] [phone]              Aftercare Plan      If I am feeling unsafe or I am in a crisis, I will:   Contact my established care providers   Call the National Suicide Prevention Lifeline: 161.542.8207   Go to the nearest emergency room   Call 913     Warning signs that I or other people might notice when a crisis is developing for me: changes to sleep, appetite or mood, increased anger, agitation or irritability, feeling depressed or hopeless, spending more time alone or talking less, increased crying, decreased productivity, seeing or hearing things that aren't there, thoughts of not wanting to live anymore or of actually killing myself, thoughts of hurting others    Things I am able to do on my own to cope or help me feel better: watching a favorite tv show or movie, listening to music I enjoy, going outside and breathing fresh air, going for a walk or exercising, taking a shower or bath, a cold or hot beverage, a healthy snack, drawing/coloring/painting, journaling, singing or dancing, deep breathing     I can try practicing square breathing when I begin to feel anxious - inhale through the nose for the count of 4 and the first line on the  square. Exhale through the mouth for the count of 4 for the second line of the square. Repeat to complete the square. Repeat the square as many times as needed.    I can also use my five senses to practice mindfulness and grounding. What are five things I can see, four things I can hear, three things I can feel, two things I can smell, and one thing I can taste.     Things that I am able to do with others to cope or help me feel better: sometimes just talking or spending time with someone else, sharing a meal or having coffee, watching a movie or playing a game, going for a walk or exercising    I can also use community resources including mental health hotlines, [facility] crisis teams, or apps.     Things I can use or do for distraction: movies/tv, music, reading, games, drawing/coloring/painting or other art, essential oils, exercise, cleaning/organizing, puzzles, crossword puzzles, word search, Sudoku       I can also download a meditation or relaxation joce, like Calm, Headspace, or Insight Timer (all three offer a free version)    Changes I can make to support my mental health and wellness: Attend scheduled mental health therapy and psychiatric appointments. Take my medications as prescribed. Maintain a daily schedule/routine. Abstain from all mood altering substances, including drugs, alcohol, or medications not currently prescribed to me. Implement a self-care routine.      People in my life that I can ask for help: friends or family, trusted teachers/staff/colleagues, trusted members of my community or place of [employer]ae[employer], mental health crisis lines, or 911    Your [facility] has a mental health crisis team you can call 24/7: [facility] Adult, [phone]    Other things that are important when I m in crisis: to remember that the feelings I am having right now are temporary, and it won't feel like this forever, and that it is okay and important to ask for help    Crisis Lines  Crisis Text Line  Text 215216  You  "will be connected with a trained live crisis counselor to provide support.    Por espanol, texto  UNIQUE a 071900 o texto a 442-AYUDAME en WhatsApp    National Hope Line  1.800.SUICIDE [8264391]      Community Resources  Fast Tracker  Linking people to mental health and substance use disorder resources  [employer]Kaizena     [place] Mental Health Warm Line  Peer to peer support  Monday thru Saturday, 12 pm to 10 pm  333.501.0717 or 5.039.368.3398  Text \"Support\" to 07490    National [place] on Mental Illness (SHIRLEY)  563.377.9755 or 1.888.SHIRLEY.HELPS      Mental Health Apps  My3  https://Near[employer].org/    VirtualHopeBox  https://Web Wonks/apps/virtual-hope-box/      Additional Information  Today you were seen by a licensed mental health professional through Triage and Transition services, [facility] ([facility])  for a crisis assessment in the Emergency Department at [facility].  It is recommended that you follow up with your established providers (psychiatrist, mental health therapist, and/or primary care doctor - as relevant) as soon as possible. Coordinators from [facility] will be calling you in the next 24-48 hours to ensure that you have the resources you need.  You can also contact [facility] coordinators directly at [phone]. You may have been scheduled for or offered an appointment with a mental health provider. [facility] maintains an extensive network of licensed behavioral health providers to connect patients with the services they need.  We do not charge providers a fee to participate in our referral network.  We match patients with providers based on a patient's specific needs, insurance coverage, and location.  Our first effort will be to refer you to a provider within your care system, and will utilize providers outside your care system as needed.        "

## 2023-10-06 ASSESSMENT — ENCOUNTER SYMPTOMS
DIZZINESS: 0
ABDOMINAL PAIN: 0
NAUSEA: 0
HEADACHES: 0
EYE PAIN: 0
HEMATOCHEZIA: 0
DYSURIA: 0
PARESTHESIAS: 0
WEAKNESS: 0
HEMATURIA: 0
JOINT SWELLING: 0
DIARRHEA: 0
CHILLS: 0
HEARTBURN: 0
CONSTIPATION: 0
PALPITATIONS: 0
SHORTNESS OF BREATH: 0
FEVER: 0
FREQUENCY: 0
SORE THROAT: 0
MYALGIAS: 0
COUGH: 0
NERVOUS/ANXIOUS: 0
ARTHRALGIAS: 0

## 2023-10-12 ASSESSMENT — PATIENT HEALTH QUESTIONNAIRE - PHQ9
10. IF YOU CHECKED OFF ANY PROBLEMS, HOW DIFFICULT HAVE THESE PROBLEMS MADE IT FOR YOU TO DO YOUR WORK, TAKE CARE OF THINGS AT HOME, OR GET ALONG WITH OTHER PEOPLE: SOMEWHAT DIFFICULT
SUM OF ALL RESPONSES TO PHQ QUESTIONS 1-9: 11
SUM OF ALL RESPONSES TO PHQ QUESTIONS 1-9: 11

## 2023-10-13 ENCOUNTER — OFFICE VISIT (OUTPATIENT)
Dept: FAMILY MEDICINE | Facility: CLINIC | Age: 33
End: 2023-10-13
Payer: COMMERCIAL

## 2023-10-13 VITALS
TEMPERATURE: 98.2 F | WEIGHT: 282 LBS | DIASTOLIC BLOOD PRESSURE: 75 MMHG | BODY MASS INDEX: 34.34 KG/M2 | HEIGHT: 76 IN | OXYGEN SATURATION: 95 % | HEART RATE: 63 BPM | SYSTOLIC BLOOD PRESSURE: 117 MMHG | RESPIRATION RATE: 18 BRPM

## 2023-10-13 DIAGNOSIS — Z01.84 IMMUNITY STATUS TESTING: ICD-10-CM

## 2023-10-13 DIAGNOSIS — C49.9 SARCOMA (H): ICD-10-CM

## 2023-10-13 DIAGNOSIS — Z11.4 SCREENING FOR HIV (HUMAN IMMUNODEFICIENCY VIRUS): ICD-10-CM

## 2023-10-13 DIAGNOSIS — F33.41 RECURRENT MAJOR DEPRESSIVE DISORDER, IN PARTIAL REMISSION (H): ICD-10-CM

## 2023-10-13 DIAGNOSIS — Z11.59 NEED FOR HEPATITIS C SCREENING TEST: ICD-10-CM

## 2023-10-13 DIAGNOSIS — R91.8 PULMONARY NODULES: ICD-10-CM

## 2023-10-13 DIAGNOSIS — Z00.00 ROUTINE GENERAL MEDICAL EXAMINATION AT A HEALTH CARE FACILITY: Primary | ICD-10-CM

## 2023-10-13 DIAGNOSIS — R91.1 PULMONARY NODULE: ICD-10-CM

## 2023-10-13 PROBLEM — E78.1 HYPERTRIGLYCERIDEMIA: Chronic | Status: RESOLVED | Noted: 2023-01-02 | Resolved: 2023-10-13

## 2023-10-13 PROBLEM — K76.0 NAFL (NONALCOHOLIC FATTY LIVER): Status: RESOLVED | Noted: 2022-06-16 | Resolved: 2023-10-13

## 2023-10-13 PROBLEM — E78.00 ELEVATED LDL CHOLESTEROL LEVEL: Chronic | Status: RESOLVED | Noted: 2020-12-07 | Resolved: 2023-10-13

## 2023-10-13 LAB
ALBUMIN SERPL BCG-MCNC: 4.2 G/DL (ref 3.5–5.2)
ALP SERPL-CCNC: 88 U/L (ref 40–129)
ALT SERPL W P-5'-P-CCNC: 50 U/L (ref 0–70)
ANION GAP SERPL CALCULATED.3IONS-SCNC: 9 MMOL/L (ref 7–15)
AST SERPL W P-5'-P-CCNC: 31 U/L (ref 0–45)
BILIRUB SERPL-MCNC: 0.2 MG/DL
BUN SERPL-MCNC: 13.1 MG/DL (ref 6–20)
CALCIUM SERPL-MCNC: 9 MG/DL (ref 8.6–10)
CHLORIDE SERPL-SCNC: 104 MMOL/L (ref 98–107)
CREAT SERPL-MCNC: 1.03 MG/DL (ref 0.67–1.17)
DEPRECATED HCO3 PLAS-SCNC: 25 MMOL/L (ref 22–29)
EGFRCR SERPLBLD CKD-EPI 2021: >90 ML/MIN/1.73M2
ERYTHROCYTE [DISTWIDTH] IN BLOOD BY AUTOMATED COUNT: 13.3 % (ref 10–15)
GLUCOSE SERPL-MCNC: 103 MG/DL (ref 70–99)
HBA1C MFR BLD: 5.7 % (ref 0–5.6)
HCT VFR BLD AUTO: 44.2 % (ref 40–53)
HGB BLD-MCNC: 14.5 G/DL (ref 13.3–17.7)
MCH RBC QN AUTO: 29.7 PG (ref 26.5–33)
MCHC RBC AUTO-ENTMCNC: 32.8 G/DL (ref 31.5–36.5)
MCV RBC AUTO: 90 FL (ref 78–100)
PLATELET # BLD AUTO: 315 10E3/UL (ref 150–450)
POTASSIUM SERPL-SCNC: 3.9 MMOL/L (ref 3.4–5.3)
PROT SERPL-MCNC: 7 G/DL (ref 6.4–8.3)
RBC # BLD AUTO: 4.89 10E6/UL (ref 4.4–5.9)
SODIUM SERPL-SCNC: 138 MMOL/L (ref 135–145)
WBC # BLD AUTO: 6.8 10E3/UL (ref 4–11)

## 2023-10-13 PROCEDURE — 83036 HEMOGLOBIN GLYCOSYLATED A1C: CPT | Performed by: FAMILY MEDICINE

## 2023-10-13 PROCEDURE — 85027 COMPLETE CBC AUTOMATED: CPT | Performed by: FAMILY MEDICINE

## 2023-10-13 PROCEDURE — 99385 PREV VISIT NEW AGE 18-39: CPT | Mod: 25 | Performed by: FAMILY MEDICINE

## 2023-10-13 PROCEDURE — 90471 IMMUNIZATION ADMIN: CPT | Performed by: FAMILY MEDICINE

## 2023-10-13 PROCEDURE — 36415 COLL VENOUS BLD VENIPUNCTURE: CPT | Performed by: FAMILY MEDICINE

## 2023-10-13 PROCEDURE — 86706 HEP B SURFACE ANTIBODY: CPT | Performed by: FAMILY MEDICINE

## 2023-10-13 PROCEDURE — 90686 IIV4 VACC NO PRSV 0.5 ML IM: CPT | Performed by: FAMILY MEDICINE

## 2023-10-13 PROCEDURE — 80053 COMPREHEN METABOLIC PANEL: CPT | Performed by: FAMILY MEDICINE

## 2023-10-13 PROCEDURE — 91320 SARSCV2 VAC 30MCG TRS-SUC IM: CPT | Performed by: FAMILY MEDICINE

## 2023-10-13 PROCEDURE — 86803 HEPATITIS C AB TEST: CPT | Performed by: FAMILY MEDICINE

## 2023-10-13 PROCEDURE — 87389 HIV-1 AG W/HIV-1&-2 AB AG IA: CPT | Performed by: FAMILY MEDICINE

## 2023-10-13 PROCEDURE — 90480 ADMN SARSCOV2 VAC 1/ONLY CMP: CPT | Performed by: FAMILY MEDICINE

## 2023-10-13 RX ORDER — BUPROPION HYDROCHLORIDE 300 MG/1
300 TABLET ORAL EVERY MORNING
Qty: 90 TABLET | Refills: 1 | Status: SHIPPED | OUTPATIENT
Start: 2023-10-13 | End: 2024-04-08

## 2023-10-13 ASSESSMENT — ANXIETY QUESTIONNAIRES
3. WORRYING TOO MUCH ABOUT DIFFERENT THINGS: MORE THAN HALF THE DAYS
1. FEELING NERVOUS, ANXIOUS, OR ON EDGE: MORE THAN HALF THE DAYS
GAD7 TOTAL SCORE: 10
4. TROUBLE RELAXING: SEVERAL DAYS
GAD7 TOTAL SCORE: 10
5. BEING SO RESTLESS THAT IT IS HARD TO SIT STILL: SEVERAL DAYS
2. NOT BEING ABLE TO STOP OR CONTROL WORRYING: MORE THAN HALF THE DAYS
7. FEELING AFRAID AS IF SOMETHING AWFUL MIGHT HAPPEN: SEVERAL DAYS
6. BECOMING EASILY ANNOYED OR IRRITABLE: SEVERAL DAYS
IF YOU CHECKED OFF ANY PROBLEMS ON THIS QUESTIONNAIRE, HOW DIFFICULT HAVE THESE PROBLEMS MADE IT FOR YOU TO DO YOUR WORK, TAKE CARE OF THINGS AT HOME, OR GET ALONG WITH OTHER PEOPLE: VERY DIFFICULT

## 2023-10-13 ASSESSMENT — ENCOUNTER SYMPTOMS
DIARRHEA: 0
SORE THROAT: 0
DIZZINESS: 0
HEARTBURN: 0
CHILLS: 0
EYE PAIN: 0
ABDOMINAL PAIN: 0
DYSURIA: 0
NAUSEA: 0
NERVOUS/ANXIOUS: 0
SHORTNESS OF BREATH: 0
PARESTHESIAS: 0
ARTHRALGIAS: 0
WEAKNESS: 0
COUGH: 0
HEMATOCHEZIA: 0
FREQUENCY: 0
CONSTIPATION: 0
JOINT SWELLING: 0
PALPITATIONS: 0
FEVER: 0
HEMATURIA: 0
MYALGIAS: 0
HEADACHES: 0

## 2023-10-13 ASSESSMENT — PAIN SCALES - GENERAL: PAINLEVEL: NO PAIN (0)

## 2023-10-13 NOTE — NURSING NOTE
Prior to immunization administration, verified patients identity using patient s name and date of birth. Please see Immunization Activity for additional information.     Screening Questionnaire for Adult Immunization    Are you sick today?   No   Do you have allergies to medications, food, a vaccine component or latex?   No   Have you ever had a serious reaction after receiving a vaccination?   No   Do you have a long-term health problem with heart, lung, kidney, or metabolic disease (e.g., diabetes), asthma, a blood disorder, no spleen, complement component deficiency, a cochlear implant, or a spinal fluid leak?  Are you on long-term aspirin therapy?   No   Do you have cancer, leukemia, HIV/AIDS, or any other immune system problem?   No   Do you have a parent, brother, or sister with an immune system problem?   No   In the past 3 months, have you taken medications that affect  your immune system, such as prednisone, other steroids, or anticancer drugs; drugs for the treatment of rheumatoid arthritis, Crohn s disease, or psoriasis; or have you had radiation treatments?   No   Have you had a seizure, or a brain or other nervous system problem?   No   During the past year, have you received a transfusion of blood or blood    products, or been given immune (gamma) globulin or antiviral drug?   No   For women: Are you pregnant or is there a chance you could become       pregnant during the next month?   No   Have you received any vaccinations in the past 4 weeks?   No     Immunization questionnaire answers were all negative.      Patient instructed to remain in clinic for 15 minutes afterwards, and to report any adverse reactions.     Screening performed by Jami Schaefer RN on 10/13/2023 at 2:10 PM.

## 2023-10-13 NOTE — RESULT ENCOUNTER NOTE
Dear Wicho,   Your results revealed a mild elevation in your A1C. A1C is a measurement of your average blood glucose over the past 3 months. Your results are consistent with pre-diabetes. I would recommend dietary restriction of high calorie meals and avoiding simple carbohydrates to prevent progression into diabetes.     We will re-check your A1C in 3-6 months.         Best Regards  Abelardo Frey MD

## 2023-10-13 NOTE — PROGRESS NOTES
SUBJECTIVE:   CC: Wicho is an 33 year old who presents for preventative health visit.       10/13/2023     1:26 PM   Additional Questions   Roomed by Jami GUZMÁN       Healthy Habits:     Getting at least 3 servings of Calcium per day:  Yes    Bi-annual eye exam:  Yes    Dental care twice a year:  Yes    Sleep apnea or symptoms of sleep apnea:  Sleep apnea    Diet:  Regular (no restrictions)    Frequency of exercise:  2-3 days/week    Duration of exercise:  15-30 minutes    Taking medications regularly:  Yes    Medication side effects:  None    Additional concerns today:  Yes  Left upper jaw osteosarcoma.     Renew CPAP   Order pulm nodule CT  Referral to pulmonolgy  Covid and flu shot    No other acute concerns per patient.      Today's PHQ-9 Score:       10/12/2023     1:51 PM   PHQ-9 SCORE   PHQ-9 Total Score MyChart 11 (Moderate depression)   PHQ-9 Total Score 11         10/13/2023     1:16 PM   LIZY-7 SCORE   Total Score 10 (moderate anxiety)   Total Score 10         Have you ever done Advance Care Planning? (For example, a Health Directive, POLST, or a discussion with a medical provider or your loved ones about your wishes): No, advance care planning information given to patient to review.  Advanced care planning was discussed at today's visit.    Social History     Tobacco Use    Smoking status: Never    Smokeless tobacco: Never    Tobacco comments:     Rarely    Substance Use Topics    Alcohol use: Yes     Comment: occasional          10/6/2023     6:19 PM   Alcohol Use   Prescreen: >3 drinks/day or >7 drinks/week? No       Reviewed orders with patient. Reviewed health maintenance and updated orders accordingly - Yes      Reviewed and updated as needed this visit by clinical staff   Tobacco  Allergies  Meds  Problems  Med Hx  Surg Hx  Fam Hx          Reviewed and updated as needed this visit by Provider   Tobacco  Allergies  Meds  Problems  Med Hx  Surg Hx  Fam Hx             Review of Systems  "  Constitutional:  Negative for chills and fever.   HENT:  Negative for congestion, ear pain, hearing loss and sore throat.    Eyes:  Negative for pain and visual disturbance.   Respiratory:  Negative for cough and shortness of breath.    Cardiovascular:  Negative for chest pain, palpitations and peripheral edema.   Gastrointestinal:  Negative for abdominal pain, constipation, diarrhea, heartburn, hematochezia and nausea.   Genitourinary:  Negative for dysuria, frequency, genital sores, hematuria, impotence, penile discharge and urgency.   Musculoskeletal:  Negative for arthralgias, joint swelling and myalgias.   Skin:  Negative for rash.   Neurological:  Negative for dizziness, weakness, headaches and paresthesias.   Psychiatric/Behavioral:  Negative for mood changes. The patient is not nervous/anxious.        OBJECTIVE:   /75   Pulse 63   Temp 98.2  F (36.8  C) (Temporal)   Resp 18   Ht 1.937 m (6' 4.25\")   Wt 127.9 kg (282 lb)   SpO2 95%   BMI 34.10 kg/m      Physical Exam  GENERAL: healthy, alert and no distress  EYES: Eyes grossly normal to inspection, PERRL and conjunctivae and sclerae normal  HENT: ear canals and TM's normal, nose and mouth without ulcers or lesions  NECK: no adenopathy, no asymmetry, masses, or scars and thyroid normal to palpation  RESP: lungs clear to auscultation - no rales, rhonchi or wheezes  CV: regular rate and rhythm, normal S1 S2, no S3 or S4, no murmur, click or rub, no peripheral edema and peripheral pulses strong  ABDOMEN: soft, nontender, no hepatosplenomegaly, no masses and bowel sounds normal  MS: no gross musculoskeletal defects noted, no edema  SKIN: no suspicious lesions or rashes  NEURO: Normal strength and tone, mentation intact and speech normal  PSYCH: mentation appears normal, affect normal/bright    Diagnostic Test Results:  Labs reviewed in Epic    ASSESSMENT/PLAN:   Wicho was seen today for physical.    Diagnoses and all orders for this " "visit:    Routine general medical examination at a health care facility  -     Comprehensive metabolic panel; Future  -     CBC with platelets; Future  -     Hemoglobin A1c; Future    Screening for HIV (human immunodeficiency virus)  -     HIV Antigen Antibody Combo; Future    Need for hepatitis C screening test  -     Hepatitis C Screen Reflex to HCV RNA Quant and Genotype; Future    Sarcoma (H)  -     CT Chest w/o Contrast; Future    Pulmonary nodule  -     CT Chest w/o Contrast; Future    Recurrent major depressive disorder, in partial remission (H24)  Patients depression moderate per PHQ-9 screening. No suicidial ideation. Will increase Wellbutrin dose to 300 mg daily from 150 mg daily. Continue to take Celexa 40 mg daily.  -     buPROPion (WELLBUTRIN XL) 300 MG 24 hr tablet; Take 1 tablet (300 mg) by mouth every morning    Immunity status testing  -     Hepatitis B Surface Antibody; Future    Other orders  -     PRIMARY CARE FOLLOW-UP SCHEDULING; Future  -     REVIEW OF HEALTH MAINTENANCE PROTOCOL ORDERS  -     INFLUENZA VACCINE IM > 6 MONTHS VALENT IIV4 (AFLURIA/FLUZONE)  -     COVID-19 12+ (2023-24) (PFIZER)        Patient has been advised of split billing requirements and indicates understanding: Yes    Depression Screening Follow Up        10/12/2023     1:51 PM   PHQ   PHQ-9 Total Score 11   Q9: Thoughts of better off dead/self-harm past 2 weeks Not at all       Follow Up Actions Taken  Crisis resource information provided in After Visit Summary  Patient counseled, no additional follow up at this time.  Adjusted patient's anti-depressant medication.       COUNSELING:   Reviewed preventive health counseling, as reflected in patient instructions  Special attention given to:        Regular exercise       Healthy diet/nutrition      BMI:   Estimated body mass index is 34.1 kg/m  as calculated from the following:    Height as of this encounter: 1.937 m (6' 4.25\").    Weight as of this encounter: 127.9 kg (282 " lb).       He reports that he has never smoked. He has never used smokeless tobacco.        Raúl Dominguez, MS3, [facility]    This visit was completed in conjunction with Dr. Frey. The information contained in this note is a result of our shared decision making process. All history, PE, results and medical decision making was reviewed by Dr. Frey.      Abelardo Frey MD  [facility]

## 2023-10-13 NOTE — PATIENT INSTRUCTIONS
RADIOLOGY SCHEDULING (Including Mammograms, Ultrasound, CT and MRI scans and Dexa Scans):  - [facility]: [phone]  - [facility]: [phone]  - [facility]: [phone]  - [facility]: [phone]      Preventive Health Recommendations  Male Ages 26 - 39    Yearly exam:             See your health care provider every year in order to  o   Review health changes.   o   Discuss preventive care.    o   Review your medicines if your doctor has prescribed any.  You should be tested each year for STDs (sexually transmitted diseases), if you re at risk.   After age 35, talk to your provider about cholesterol testing. If you are at risk for heart disease, have your cholesterol tested at least every 5 years.   If you are at risk for diabetes, you should have a diabetes test (fasting glucose).  Shots: Get a flu shot each year. Get a tetanus shot every 10 years.     Nutrition:  Eat at least 5 servings of fruits and vegetables daily.   Eat whole-grain bread, whole-wheat pasta and brown rice instead of white grains and rice.   Get adequate Calcium and Vitamin D.     Lifestyle  Exercise for at least 150 minutes a week (30 minutes a day, 5 days a week). This will help you control your weight and prevent disease.   Limit alcohol to one drink per day.   No smoking.   Wear sunscreen to prevent skin cancer.   See your dentist every six months for an exam and cleaning.

## 2023-10-14 LAB
HCV AB SERPL QL IA: NONREACTIVE
HIV 1+2 AB+HIV1 P24 AG SERPL QL IA: NONREACTIVE

## 2023-10-15 LAB
HBV SURFACE AB SERPL IA-ACNC: 8.97 M[IU]/ML
HBV SURFACE AB SERPL IA-ACNC: NORMAL M[IU]/ML

## 2023-10-19 NOTE — RESULT ENCOUNTER NOTE
Dear Wicho,   Your results revealed that you do not have adequate immunity against hepatitis B. Lack of adequate immunity puts you at risk of erick hepatitis B infection in the future.  I would recommend scheduling a nurse only visit for immunizations.      Best Regards  Abelardo Frey MD

## 2023-10-24 ENCOUNTER — ANCILLARY PROCEDURE (OUTPATIENT)
Dept: CT IMAGING | Facility: CLINIC | Age: 33
End: 2023-10-24
Attending: FAMILY MEDICINE
Payer: COMMERCIAL

## 2023-10-24 DIAGNOSIS — C49.9 SARCOMA (H): ICD-10-CM

## 2023-10-24 DIAGNOSIS — R91.1 PULMONARY NODULE: ICD-10-CM

## 2023-10-24 PROCEDURE — 71250 CT THORAX DX C-: CPT

## 2023-10-25 NOTE — RESULT ENCOUNTER NOTE
Dear Wicho,   Your results revealed a small benign nodules. No new or growing nodules noted.     Best Regards  Abelardo Frey MD

## 2023-10-27 ENCOUNTER — ALLIED HEALTH/NURSE VISIT (OUTPATIENT)
Dept: FAMILY MEDICINE | Facility: CLINIC | Age: 33
End: 2023-10-27
Payer: COMMERCIAL

## 2023-10-27 DIAGNOSIS — Z23 NEED FOR VACCINATION: Primary | ICD-10-CM

## 2023-10-27 PROCEDURE — 99207 PR NO CHARGE NURSE ONLY: CPT

## 2023-10-27 PROCEDURE — 90746 HEPB VACCINE 3 DOSE ADULT IM: CPT

## 2023-10-27 PROCEDURE — 90471 IMMUNIZATION ADMIN: CPT

## 2023-10-27 NOTE — PROGRESS NOTES
Prior to immunization administration, verified patients identity using patient s name and date of birth. Please see Immunization Activity for additional information.     Screening Questionnaire for Adult Immunization    Are you sick today?   No   Do you have allergies to medications, food, a vaccine component or latex?   No   Have you ever had a serious reaction after receiving a vaccination?   No   Do you have a long-term health problem with heart, lung, kidney, or metabolic disease (e.g., diabetes), asthma, a blood disorder, no spleen, complement component deficiency, a cochlear implant, or a spinal fluid leak?  Are you on long-term aspirin therapy?   No   Do you have cancer, leukemia, HIV/AIDS, or any other immune system problem?   No   Do you have a parent, brother, or sister with an immune system problem?   No   In the past 3 months, have you taken medications that affect  your immune system, such as prednisone, other steroids, or anticancer drugs; drugs for the treatment of rheumatoid arthritis, Crohn s disease, or psoriasis; or have you had radiation treatments?   No   Have you had a seizure, or a brain or other nervous system problem?   No   During the past year, have you received a transfusion of blood or blood    products, or been given immune (gamma) globulin or antiviral drug?   No   For women: Are you pregnant or is there a chance you could become       pregnant during the next month?   No   Have you received any vaccinations in the past 4 weeks?   Yes     Immunization questionnaire was positive for at least one answer.  Positive answer was in regards to COVID vaccine 2 weeks prior.    I have reviewed the following standing orders:   This patient is due and qualifies for the Hepatitis B vaccine.    Click here for Hepatitis B Standing Order    I have reviewed the vaccines inclusion and exclusion criteria; No concerns regarding eligibility.     Patient instructed to remain in clinic for 15 minutes  afterwards, and to report any adverse reactions.     Screening performed by Jesus Dalton CMA on 10/27/2023 at 10:51 AM.

## 2023-10-28 ENCOUNTER — MYC MEDICAL ADVICE (OUTPATIENT)
Dept: FAMILY MEDICINE | Facility: CLINIC | Age: 33
End: 2023-10-28
Payer: COMMERCIAL

## 2023-10-30 NOTE — TELEPHONE ENCOUNTER
FS,        Please see [employer] message.  Does patient only need one time booster?    Orquidea Schmitz RN

## 2023-10-30 NOTE — TELEPHONE ENCOUNTER
Patient has a documented hepatitis B immunization on file. He should get a single booster shot.    MD Tk

## 2023-11-17 ENCOUNTER — TRANSCRIBE ORDERS (OUTPATIENT)
Dept: OTHER | Age: 33
End: 2023-11-17

## 2023-11-17 DIAGNOSIS — J35.8 TONSILLAR MASS: Primary | ICD-10-CM

## 2023-11-20 DIAGNOSIS — J35.8 TONSILLAR MASS: Primary | ICD-10-CM

## 2023-12-06 ENCOUNTER — OFFICE VISIT (OUTPATIENT)
Dept: OTOLARYNGOLOGY | Facility: CLINIC | Age: 33
End: 2023-12-06
Payer: COMMERCIAL

## 2023-12-06 ENCOUNTER — ANCILLARY PROCEDURE (OUTPATIENT)
Dept: CT IMAGING | Facility: CLINIC | Age: 33
End: 2023-12-06
Attending: OTOLARYNGOLOGY
Payer: COMMERCIAL

## 2023-12-06 VITALS
OXYGEN SATURATION: 95 % | SYSTOLIC BLOOD PRESSURE: 119 MMHG | TEMPERATURE: 97.2 F | WEIGHT: 272 LBS | BODY MASS INDEX: 33.12 KG/M2 | DIASTOLIC BLOOD PRESSURE: 67 MMHG | HEART RATE: 78 BPM | HEIGHT: 76 IN

## 2023-12-06 DIAGNOSIS — J35.8 TONSILLAR MASS: Primary | ICD-10-CM

## 2023-12-06 DIAGNOSIS — J35.8 TONSILLAR MASS: ICD-10-CM

## 2023-12-06 PROCEDURE — 99203 OFFICE O/P NEW LOW 30 MIN: CPT | Performed by: OTOLARYNGOLOGY

## 2023-12-06 PROCEDURE — 70491 CT SOFT TISSUE NECK W/DYE: CPT | Mod: GC | Performed by: RADIOLOGY

## 2023-12-06 RX ORDER — IOPAMIDOL 755 MG/ML
90 INJECTION, SOLUTION INTRAVASCULAR ONCE
Status: COMPLETED | OUTPATIENT
Start: 2023-12-06 | End: 2023-12-06

## 2023-12-06 RX ADMIN — IOPAMIDOL 90 ML: 755 INJECTION, SOLUTION INTRAVASCULAR at 14:22

## 2023-12-06 ASSESSMENT — PAIN SCALES - GENERAL: PAINLEVEL: NO PAIN (1)

## 2023-12-06 NOTE — PROGRESS NOTES
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 Ross.  He has been doing fine since that time.    Interval history: The patient came to see us today because he noted some discomfort in the right side of his throat and took a picture in his oral cavity and thought that his tonsil looked abnormal.  Since that time, he feels the lesion may have resolved.  He denies otalgia odynophagia hemoptysis dysphagia or hoarseness.  He has not noticed any lumps or bumps in his neck.    Physical examination: He is well-appearing in no distress.  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  Remainder of the oral cavity including the tongue, floor mouth, hard soft palate syndrome of mucosa retromolar trigone and posterior pharyngeal wall are all normal palpation of the floor mouth and base tongue are now normal he cannot tolerate mirror exam.  I was able to examine the tonsils very well and the lesion that he photographed previously is no longer present on the right tonsil.  Examination of the neck reveals no mass or lymphadenopathy.    Impression SUREKHA    Plan:  1.  The patient will let us know if anything transpires in the future with regard to his tonsil  2.  The patient asked about reconstructive surgery to close the oroantral fistula I suggested we could possibly consider a Price flap or a palatal island flap.  A forearm flap may be too much tissue.  I would discuss this with my colleagues and we will get back to him.      Cj Brenner M.D.

## 2023-12-06 NOTE — LETTER
12/6/2023       RE: Wicho Morocho  [id number] [id number]     Dear Colleague,    Thank you for referring your patient, Wicho Morocho, to the [facility] [place] at [facility]. Please see a copy of my visit note below.    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 Ross.  He has been doing fine since that time.    Interval history: The patient came to see us today because he noted some discomfort in the right side of his throat and took a picture in his oral cavity and thought that his tonsil looked abnormal.  Since that time, he feels the lesion may have resolved.  He denies otalgia odynophagia hemoptysis dysphagia or hoarseness.  He has not noticed any lumps or bumps in his neck.    Physical examination: He is well-appearing in no distress.  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  Remainder of the oral cavity including the tongue, floor mouth, hard soft palate syndrome of mucosa retromolar trigone and posterior pharyngeal wall are all normal palpation of the floor mouth and base tongue are now normal he cannot tolerate mirror exam.  I was able to examine the tonsils very well and the lesion that he photographed previously is no longer present on the right tonsil.  Examination of the neck reveals no mass or lymphadenopathy.    Impression SUREKHA    Plan:  1.  The patient will let us know if anything transpires in the future with regard to his tonsil  2.  The patient asked about reconstructive surgery to close the oroantral fistula I suggested we could possibly consider a Price flap or a palatal island flap.  A forearm flap may be too much  tissue.  I would discuss this with my colleagues and we will get back to him.      Cj Brenner M.D.

## 2023-12-06 NOTE — DISCHARGE INSTRUCTIONS

## 2023-12-06 NOTE — NURSING NOTE
"Chief Complaint   Patient presents with    RECHECK     Follow up new  lesion on tonsil     Blood pressure 119/67, pulse 78, temperature 97.2  F (36.2  C), height 1.93 m (6' 4\"), weight 123.4 kg (272 lb), SpO2 95%.  David Mosley LPN    "

## 2023-12-21 ENCOUNTER — MYC REFILL (OUTPATIENT)
Dept: FAMILY MEDICINE | Facility: CLINIC | Age: 33
End: 2023-12-21
Payer: COMMERCIAL

## 2023-12-21 DIAGNOSIS — F41.9 ANXIETY: Primary | ICD-10-CM

## 2023-12-21 RX ORDER — CITALOPRAM HYDROBROMIDE 40 MG/1
40 TABLET ORAL DAILY
Qty: 30 TABLET | Refills: 0 | Status: SHIPPED | OUTPATIENT
Start: 2023-12-21 | End: 2024-01-22

## 2024-01-22 ENCOUNTER — MYC REFILL (OUTPATIENT)
Dept: FAMILY MEDICINE | Facility: CLINIC | Age: 34
End: 2024-01-22
Payer: COMMERCIAL

## 2024-01-22 DIAGNOSIS — F41.9 ANXIETY: ICD-10-CM

## 2024-01-23 RX ORDER — CITALOPRAM HYDROBROMIDE 40 MG/1
40 TABLET ORAL DAILY
Qty: 90 TABLET | Refills: 1 | Status: SHIPPED | OUTPATIENT
Start: 2024-01-23 | End: 2024-05-16

## 2024-04-08 DIAGNOSIS — F33.41 RECURRENT MAJOR DEPRESSIVE DISORDER, IN PARTIAL REMISSION (H): ICD-10-CM

## 2024-04-08 RX ORDER — BUPROPION HYDROCHLORIDE 300 MG/1
300 TABLET ORAL EVERY MORNING
Qty: 30 TABLET | Refills: 0 | Status: SHIPPED | OUTPATIENT
Start: 2024-04-08 | End: 2024-05-10

## 2024-05-16 ENCOUNTER — VIRTUAL VISIT (OUTPATIENT)
Dept: FAMILY MEDICINE | Facility: CLINIC | Age: 34
End: 2024-05-16
Payer: COMMERCIAL

## 2024-05-16 DIAGNOSIS — F33.41 RECURRENT MAJOR DEPRESSIVE DISORDER, IN PARTIAL REMISSION (H): ICD-10-CM

## 2024-05-16 DIAGNOSIS — F41.9 ANXIETY: Primary | ICD-10-CM

## 2024-05-16 PROCEDURE — 99214 OFFICE O/P EST MOD 30 MIN: CPT | Mod: 95 | Performed by: FAMILY MEDICINE

## 2024-05-16 NOTE — PROGRESS NOTES
"Wicho is a 33 year old who is being evaluated via a billable video visit.    How would you like to obtain your AVS? MyChart  If the video visit is dropped, the invitation should be resent by: Text to cell phone: [phone]  Will anyone else be joining your video visit? No    Assessment & Plan     Wicho was seen today for  follow up.    Diagnoses and all orders for this visit:    Anxiety  Recurrent major depressive disorder, in partial remission (H24)  33-year-old pleasant patient with a long history of depression and anxiety.  He is currently taking Celexa 40 mg and Wellbutrin 300 mg significant improvement in his symptoms.  He was prescribed both medications for the last 4 years.  He also tried Prozac previously and recalls feeling very flat with Prozac.  Currently seeing a therapist for cognitive behavioral therapy.  Anxiety increased in the last few weeks after finding out that his wife is pregnant with their second child.  He recalls having a traumatic experience with the birth first child, afraid of having similar complications with a second child as well.  We reviewed his PHQ-9 and LIZY-7 scores today.  I recommended transitioning from Celexa to Zoloft.  I advised patient to decrease his dose of Celexa to 20 mg for the next 1 week then start Zoloft at 50 mg once a day.  I also advised him to schedule a follow-up in 1 month.  -     sertraline (ZOLOFT) 50 MG tablet; Take 1 tablet (50 mg) by mouth daily    Other orders  -     PRIMARY CARE FOLLOW-UP SCHEDULING; Future         Follow-up Visit   Expected date:  Jun 16, 2024 (Approximate)      Follow Up Appointment Details:     Follow-up with whom?: Me    Follow-Up for what?: Chronic Disease f/u    Chronic Disease f/u:  Anxiety  Depression       How?: In Person or Virtual                       BMI  Estimated body mass index is 33.11 kg/m  as calculated from the following:    Height as of 12/6/23: 1.93 m (6' 4\").    Weight as of 12/6/23: 123.4 kg (272 lb). "       Orders Placed This Encounter   Medications    sertraline (ZOLOFT) 50 MG tablet     Sig: Take 1 tablet (50 mg) by mouth daily     Dispense:  90 tablet     Refill:  1     Medications Discontinued During This Encounter   Medication Reason    citalopram (CELEXA) 40 MG tablet Therapy completed (No AVS)   Patient is transitioning to Zoloft.        - Continue other medications without change    Subjective   Wicho is a 33 year old, presenting for the following health issues:  No chief complaint on file.      5/16/2024     9:07 AM   Additional Questions   Roomed by Yuliet WORKMAN     History of Present Illness       Mental Health Follow-up:  Patient presents to follow-up on Depression & Anxiety.Patient's depression since last visit has been:  No change  The patient is not having other symptoms associated with depression.  Patient's anxiety since last visit has been:  Worse  The patient is not having other symptoms associated with anxiety.  Any significant life events: No  Patient is feeling anxious or having panic attacks.  Patient has no concerns about alcohol or drug use.    He eats 0-1 servings of fruits and vegetables daily.He consumes 0 sweetened beverage(s) daily.He exercises with enough effort to increase his heart rate 9 or less minutes per day.  He exercises with enough effort to increase his heart rate 3 or less days per week. He is missing 1 dose(s) of medications per week.  He is not taking prescribed medications regularly due to remembering to take.  33-year-old pleasant patient with a long history of depression and anxiety.  He is currently taking Celexa 40 mg and Wellbutrin 300 mg significant improvement in his symptoms.  He was prescribed both medications for the last 4 years.  He also tried Prozac previously and recalls feeling very flat with Prozac.  Currently seeing a therapist for cognitive behavioral therapy.  Anxiety increased in the last few weeks after finding out that his wife is pregnant with their  second child.  He recalls having a traumatic experience with the birth first child, afraid of having similar complications with a second child as well.  Patient took Prozac in the past and felt dull.     Patient is seeing a therapist. Last meeting was three weeks ago. Working on trauma therapy        10/12/2023     1:51 PM 5/9/2024     2:31 PM   PHQ   PHQ-9 Total Score 11 13   Q9: Thoughts of better off dead/self-harm past 2 weeks Not at all Not at all          10/13/2023     1:16 PM 5/9/2024     2:32 PM   LIZY-7 SCORE   Total Score 10 (moderate anxiety) 9 (mild anxiety)   Total Score 10 9          Review of Systems  Constitutional, neuro, ENT, endocrine, pulmonary, cardiac, gastrointestinal, genitourinary, musculoskeletal, integument and psychiatric systems are negative, except as otherwise noted.      Objective         Vitals:  No vitals were obtained today due to virtual visit.    Physical Exam   GENERAL: alert and no distress  EYES: Eyes grossly normal to inspection.  No discharge or erythema, or obvious scleral/conjunctival abnormalities.  RESP: No audible wheeze, cough, or visible cyanosis.    SKIN: Visible skin clear. No significant rash, abnormal pigmentation or lesions.  NEURO: Cranial nerves grossly intact.  Mentation and speech appropriate for age.  PSYCH: Appropriate affect, tone, and pace of words      Video-Visit Details    Type of service:  Video Visit   Originating Location (pt. Location): Home  Distant Location (provider location):  Off-site  Platform used for Video Visit: Nathaniel  Signed Electronically by: Abelardo Frey MD

## 2024-05-16 NOTE — LETTER
My Depression Action Plan  Name: Wicho Morocho   Date of Birth 1990  Date: 5/16/2024    My doctor: Abelardo Frey   [facility]  [address]  [facility] [address] [phone]            GREEN    ZONE   Good Control    What it looks like:   Things are going generally well. You have normal ups and downs. You may even feel depressed from time to time, but bad moods usually last less than a day.   What you need to do:  Continue to care for yourself (see self care plan)  Check your depression survival kit and update it as needed  Follow your physician s recommendations including any medication.  Do not stop taking medication unless you consult with your physician first.             YELLOW         ZONE Getting Worse    What it looks like:   Depression is starting to interfere with your life.   It may be hard to get out of bed; you may be starting to isolate yourself from others.  Symptoms of depression are starting to last most all day and this has happened for several days.   You may have suicidal thoughts but they are not constant.   What you need to do:     Call your care team. Your response to treatment will improve if you keep your care team informed of your progress. Yellow periods are signs an adjustment may need to be made.     Continue your self-care.  Just get dressed and ready for the day.  Don't give yourself time to talk yourself out of it.    Talk to someone in your support network.    Open up your Depression Self-Care Plan/Wellness Kit.             RED    ZONE Medical Alert - Get Help    What it looks like:   Depression is seriously interfering with your life.   You may experience these or other symptoms: You can t get out of bed most days, can t work or engage in other necessary activities, you have trouble taking care of basic hygiene, or basic responsibilities, thoughts of suicide or death that will not go away,  self-injurious behavior.     What you need to do:  Call your care team and request a same-day appointment. If they are not available (weekends or after hours) call your local crisis line, emergency room or 911.          Depression Self-Care Plan / Wellness Kit    Many people find that medication and therapy are helpful treatments for managing depression. In addition, making small changes to your everyday life can help to boost your mood and improve your wellbeing. Below are some tips for you to consider. Be sure to talk with your medical provider and/or behavioral health consultant if your symptoms are worsening or not improving.     Sleep   Sleep hygiene  means all of the habits that support good, restful sleep. It includes maintaining a consistent bedtime and wake time, using your bedroom only for sleeping or sex, and keeping the bedroom dark and free of distractions like a computer, smartphone, or television.     Develop a Healthy Routine  Maintain good hygiene. Get out of bed in the morning, make your bed, brush your teeth, take a shower, and get dressed. Don t spend too much time viewing media that makes you feel stressed. Find time to relax each day.    Exercise  Get some form of exercise every day. This will help reduce pain and release endorphins, the  feel good  chemicals in your brain. It can be as simple as just going for a walk or doing some gardening, anything that will get you moving.      Diet  Strive to eat healthy foods, including fruits and vegetables. Drink plenty of water. Avoid excessive sugar, caffeine, alcohol, and other mood-altering substances.     Stay Connected with Others  Stay in touch with friends and family members.    Manage Your Mood  Try deep breathing, massage therapy, biofeedback, or meditation. Take part in fun activities when you can. Try to find something to smile about each day.     Psychotherapy  Be open to working with a therapist if your provider recommends it.      Medication  Be sure to take your medication as prescribed. Most anti-depressants need to be taken every day. It usually takes several weeks for medications to work. Not all medicines work for all people. It is important to follow-up with your provider to make sure you have a treatment plan that is working for you. Do not stop your medication abruptly without first discussing it with your provider.    Crisis Resources   These hotlines are for both adults and children. They and are open 24 hours a day, 7 days a week unless noted otherwise.    National Suicide Prevention Lifeline   988 or 8-050-985-YCYX (8612)    Crisis Text Line    www.crisistextline.org  Text HOME to 375054 from anywhere in the [place], anytime, about any type of crisis. A live, trained crisis counselor will receive the text and respond quickly.    [place] Lifeline for LGBTQ Youth  A national crisis intervention and suicide lifeline for LGBTQ youth under 25. Provides a safe place to talk without judgement. Call 1-798.514.1787; text START to 062500 or visit www.thetrevorproject.org to talk to a trained counselor.    For [facility] crisis numbers, visit the [facility] website at:  https://[place].gov/dhs/people-we-serve/adults/health-care/mental-health/resources/crisis-contacts.jsp

## 2024-09-24 ENCOUNTER — PATIENT OUTREACH (OUTPATIENT)
Dept: CARE COORDINATION | Facility: CLINIC | Age: 34
End: 2024-09-24
Payer: COMMERCIAL

## 2024-10-08 ENCOUNTER — PATIENT OUTREACH (OUTPATIENT)
Dept: CARE COORDINATION | Facility: CLINIC | Age: 34
End: 2024-10-08
Payer: COMMERCIAL

## 2024-11-18 DIAGNOSIS — F41.9 ANXIETY: ICD-10-CM

## 2024-11-18 DIAGNOSIS — F33.41 RECURRENT MAJOR DEPRESSIVE DISORDER, IN PARTIAL REMISSION (H): ICD-10-CM

## 2024-11-24 ENCOUNTER — HEALTH MAINTENANCE LETTER (OUTPATIENT)
Age: 34
End: 2024-11-24

## 2025-03-11 DIAGNOSIS — F41.9 ANXIETY: ICD-10-CM

## 2025-03-11 DIAGNOSIS — F33.41 RECURRENT MAJOR DEPRESSIVE DISORDER, IN PARTIAL REMISSION: ICD-10-CM

## 2025-06-12 ENCOUNTER — MYC REFILL (OUTPATIENT)
Dept: FAMILY MEDICINE | Facility: CLINIC | Age: 35
End: 2025-06-12
Payer: COMMERCIAL

## 2025-06-12 DIAGNOSIS — F33.41 RECURRENT MAJOR DEPRESSIVE DISORDER, IN PARTIAL REMISSION: ICD-10-CM

## 2025-06-12 DIAGNOSIS — F41.9 ANXIETY: ICD-10-CM

## 2025-07-11 SDOH — HEALTH STABILITY: PHYSICAL HEALTH: ON AVERAGE, HOW MANY MINUTES DO YOU ENGAGE IN EXERCISE AT THIS LEVEL?: 40 MIN

## 2025-07-11 SDOH — HEALTH STABILITY: PHYSICAL HEALTH: ON AVERAGE, HOW MANY DAYS PER WEEK DO YOU ENGAGE IN MODERATE TO STRENUOUS EXERCISE (LIKE A BRISK WALK)?: 3 DAYS

## 2025-07-11 ASSESSMENT — SOCIAL DETERMINANTS OF HEALTH (SDOH): HOW OFTEN DO YOU GET TOGETHER WITH FRIENDS OR RELATIVES?: THREE TIMES A WEEK

## 2025-07-16 ENCOUNTER — OFFICE VISIT (OUTPATIENT)
Dept: FAMILY MEDICINE | Facility: CLINIC | Age: 35
End: 2025-07-16
Payer: COMMERCIAL

## 2025-07-16 VITALS
HEIGHT: 76 IN | TEMPERATURE: 98.4 F | WEIGHT: 287.4 LBS | OXYGEN SATURATION: 97 % | SYSTOLIC BLOOD PRESSURE: 123 MMHG | DIASTOLIC BLOOD PRESSURE: 72 MMHG | RESPIRATION RATE: 16 BRPM | BODY MASS INDEX: 35 KG/M2 | HEART RATE: 74 BPM

## 2025-07-16 DIAGNOSIS — G47.33 OSA ON CPAP: Chronic | ICD-10-CM

## 2025-07-16 DIAGNOSIS — Z00.00 ENCOUNTER FOR PREVENTATIVE ADULT HEALTH CARE EXAMINATION: Primary | ICD-10-CM

## 2025-07-16 DIAGNOSIS — F33.41 RECURRENT MAJOR DEPRESSIVE DISORDER, IN PARTIAL REMISSION: ICD-10-CM

## 2025-07-16 DIAGNOSIS — F41.9 ANXIETY: ICD-10-CM

## 2025-07-16 PROBLEM — R73.03 PRE-DIABETES: Status: ACTIVE | Noted: 2023-01-02

## 2025-07-16 LAB
ALBUMIN SERPL BCG-MCNC: 4.5 G/DL (ref 3.5–5.2)
ALP SERPL-CCNC: 94 U/L (ref 40–150)
ALT SERPL W P-5'-P-CCNC: 79 U/L (ref 0–70)
ANION GAP SERPL CALCULATED.3IONS-SCNC: 13 MMOL/L (ref 7–15)
AST SERPL W P-5'-P-CCNC: 46 U/L (ref 0–45)
BILIRUB SERPL-MCNC: 0.3 MG/DL
BUN SERPL-MCNC: 21.7 MG/DL (ref 6–20)
CALCIUM SERPL-MCNC: 9.7 MG/DL (ref 8.8–10.4)
CHLORIDE SERPL-SCNC: 103 MMOL/L (ref 98–107)
CHOLEST SERPL-MCNC: 221 MG/DL
CREAT SERPL-MCNC: 1.34 MG/DL (ref 0.67–1.17)
EGFRCR SERPLBLD CKD-EPI 2021: 71 ML/MIN/1.73M2
ERYTHROCYTE [DISTWIDTH] IN BLOOD BY AUTOMATED COUNT: 12.7 % (ref 10–15)
EST. AVERAGE GLUCOSE BLD GHB EST-MCNC: 114 MG/DL
FASTING STATUS PATIENT QL REPORTED: NO
FASTING STATUS PATIENT QL REPORTED: NO
GLUCOSE SERPL-MCNC: 81 MG/DL (ref 70–99)
HBA1C MFR BLD: 5.6 % (ref 0–5.6)
HCO3 SERPL-SCNC: 22 MMOL/L (ref 22–29)
HCT VFR BLD AUTO: 44.3 % (ref 40–53)
HDLC SERPL-MCNC: 36 MG/DL
HGB BLD-MCNC: 15.3 G/DL (ref 13.3–17.7)
LDLC SERPL CALC-MCNC: 156 MG/DL
MCH RBC QN AUTO: 29.8 PG (ref 26.5–33)
MCHC RBC AUTO-ENTMCNC: 34.5 G/DL (ref 31.5–36.5)
MCV RBC AUTO: 86 FL (ref 78–100)
NONHDLC SERPL-MCNC: 185 MG/DL
PLATELET # BLD AUTO: 367 10E3/UL (ref 150–450)
POTASSIUM SERPL-SCNC: 4.1 MMOL/L (ref 3.4–5.3)
PROT SERPL-MCNC: 7.4 G/DL (ref 6.4–8.3)
RBC # BLD AUTO: 5.14 10E6/UL (ref 4.4–5.9)
SODIUM SERPL-SCNC: 138 MMOL/L (ref 135–145)
TRIGL SERPL-MCNC: 143 MG/DL
WBC # BLD AUTO: 7.4 10E3/UL (ref 4–11)

## 2025-07-16 PROCEDURE — 3078F DIAST BP <80 MM HG: CPT | Performed by: FAMILY MEDICINE

## 2025-07-16 PROCEDURE — 80061 LIPID PANEL: CPT | Performed by: FAMILY MEDICINE

## 2025-07-16 PROCEDURE — 36415 COLL VENOUS BLD VENIPUNCTURE: CPT | Performed by: FAMILY MEDICINE

## 2025-07-16 PROCEDURE — 83036 HEMOGLOBIN GLYCOSYLATED A1C: CPT | Performed by: FAMILY MEDICINE

## 2025-07-16 PROCEDURE — 85027 COMPLETE CBC AUTOMATED: CPT | Performed by: FAMILY MEDICINE

## 2025-07-16 PROCEDURE — 99214 OFFICE O/P EST MOD 30 MIN: CPT | Mod: 25 | Performed by: FAMILY MEDICINE

## 2025-07-16 PROCEDURE — 99395 PREV VISIT EST AGE 18-39: CPT | Performed by: FAMILY MEDICINE

## 2025-07-16 PROCEDURE — 3074F SYST BP LT 130 MM HG: CPT | Performed by: FAMILY MEDICINE

## 2025-07-16 PROCEDURE — 1126F AMNT PAIN NOTED NONE PRSNT: CPT | Performed by: FAMILY MEDICINE

## 2025-07-16 PROCEDURE — 3044F HG A1C LEVEL LT 7.0%: CPT | Performed by: FAMILY MEDICINE

## 2025-07-16 PROCEDURE — 80053 COMPREHEN METABOLIC PANEL: CPT | Performed by: FAMILY MEDICINE

## 2025-07-16 RX ORDER — SERTRALINE HYDROCHLORIDE 25 MG/1
TABLET, FILM COATED ORAL
Qty: 21 TABLET | Refills: 0 | Status: SHIPPED | OUTPATIENT
Start: 2025-07-16 | End: 2025-08-13

## 2025-07-16 ASSESSMENT — PATIENT HEALTH QUESTIONNAIRE - PHQ9
SUM OF ALL RESPONSES TO PHQ QUESTIONS 1-9: 6
SUM OF ALL RESPONSES TO PHQ QUESTIONS 1-9: 6
10. IF YOU CHECKED OFF ANY PROBLEMS, HOW DIFFICULT HAVE THESE PROBLEMS MADE IT FOR YOU TO DO YOUR WORK, TAKE CARE OF THINGS AT HOME, OR GET ALONG WITH OTHER PEOPLE: NOT DIFFICULT AT ALL

## 2025-07-16 ASSESSMENT — PAIN SCALES - GENERAL: PAINLEVEL_OUTOF10: NO PAIN (0)

## 2025-07-16 NOTE — PROGRESS NOTES
"Preventive Care Visit  [facility]  Abelardo Frey MD, Family Medicine  Jul 16, 2025      Assessment & Plan     Wicho was seen today for physical.    Diagnoses and all orders for this visit:    Encounter for preventative adult health care examination  -     Hemoglobin A1c; Future  -     Lipid panel reflex to direct LDL Non-fasting; Future  -     Comprehensive metabolic panel (BMP + Alb, Alk Phos, ALT, AST, Total. Bili, TP); Future  -     CBC with platelets; Future    Anxiety  Recurrent major depressive disorder, in partial remission  Patient is doing well mentally.  He desires to start tapering down mental health medications.  We mutually agreed to start tapering Zoloft.  -     sertraline (ZOLOFT) 25 MG tablet; Take 1 tablet (25 mg) by mouth daily for 14 days, THEN 0.5 tablets (12.5 mg) daily for 14 days.    ROBERT on CPAP  Patient has a history of obstructive sleep apnea currently on a CPAP.  Denies any complications.  Other orders  -     REVIEW OF HEALTH MAINTENANCE PROTOCOL ORDERS        Health Maintenance Reviewed:  Reviewed: Up-to-date.      Patient has been advised of split billing requirements and indicates understanding: Yes    BMI  Estimated body mass index is 34.75 kg/m  as calculated from the following:    Height as of this encounter: 1.937 m (6' 4.25\").    Weight as of this encounter: 130.4 kg (287 lb 6.4 oz).       Counseling  Appropriate preventive services were addressed with this patient via screening, questionnaire, or discussion as appropriate for fall prevention, nutrition, physical activity, Tobacco-use cessation, social engagement, weight loss and cognition.  Checklist reviewing preventive services available has been given to the patient.  Reviewed patient's diet, addressing concerns and/or questions.   He is at risk for lack of exercise and has been provided with information to increase physical activity for the benefit of his well-being.   Reviewed preventive health " counseling, as reflected in patient instructions    Follow-up       Graham Sands is a 34 year old, presenting for the following:  Physical        7/16/2025     1:36 PM   Additional Questions   Roomed by ARIELLE CHIN   Accompanied by N/A        HPI  Advance Care Planning  Discussed advance care planning with patient; informed AVS has link to [facility].        7/11/2025   General Health   How would you rate your overall physical health? (!) FAIR   Feel stress (tense, anxious, or unable to sleep) Only a little   (!) STRESS CONCERN      7/11/2025   Nutrition   Three or more servings of calcium each day? Yes   Diet: Regular (no restrictions)   How many servings of fruit and vegetables per day? (!) 2-3   How many sweetened beverages each day? 0-1         7/11/2025   Exercise   Days per week of moderate/strenous exercise 3 days   Average minutes spent exercising at this level 40 min         7/11/2025   Social Factors   Frequency of gathering with friends or relatives Three times a week   Worry food won't last until get money to buy more No   Food not last or not have enough money for food? No   Do you have housing? (Housing is defined as stable permanent housing and does not include staying outside in a car, in a tent, in an abandoned building, in an overnight shelter, or couch-surfing.) Yes   Are you worried about losing your housing? No   Lack of transportation? No   Unable to get utilities (heat,electricity)? No         7/11/2025   Dental   Dentist two times every year? Yes       Today's PHQ-9 Score:       7/16/2025     1:31 PM   PHQ-9 SCORE   PHQ-9 Total Score MyChart 6 (Mild depression)   PHQ-9 Total Score 6        Patient-reported         7/11/2025   Substance Use   Alcohol more than 3/day or more than 7/wk No   Do you use any other substances recreationally? No     Social History     Tobacco Use    Smoking status: Never    Smokeless tobacco: Never    Tobacco comments:     Rarely    Vaping Use    Vaping  "status: Never Used   Substance Use Topics    Alcohol use: Yes     Comment: occasional     Drug use: No     Comment: Heavy maryjuana use for 4 years 2 years ago.            7/11/2025   STI Screening   New sexual partner(s) since last STI/HIV test? No         7/11/2025   Contraception/Family Planning   Questions about contraception or family planning No        Reviewed and updated as needed this visit by Provider                    Review of Systems  Constitutional, neuro, ENT, endocrine, pulmonary, cardiac, gastrointestinal, genitourinary, musculoskeletal, integument and psychiatric systems are negative, except as otherwise noted.     Objective    Exam  /72 (BP Location: Left arm, Patient Position: Sitting, Cuff Size: Adult Regular)   Pulse 74   Temp 98.4  F (36.9  C) (Temporal)   Resp 16   Ht 1.937 m (6' 4.25\")   Wt 130.4 kg (287 lb 6.4 oz)   SpO2 97%   BMI 34.75 kg/m     Estimated body mass index is 34.75 kg/m  as calculated from the following:    Height as of this encounter: 1.937 m (6' 4.25\").    Weight as of this encounter: 130.4 kg (287 lb 6.4 oz).    Physical Exam  GENERAL: alert and no distress  EYES: Eyes grossly normal to inspection, PERRL and conjunctivae and sclerae normal  HENT: ear canals and TM's normal, nose and mouth without ulcers or lesions  NECK: no adenopathy, no asymmetry, masses, or scars  RESP: lungs clear to auscultation - no rales, rhonchi or wheezes  CV: regular rate and rhythm, normal S1 S2, no S3 or S4, no murmur, click or rub, no peripheral edema  ABDOMEN: soft, nontender, no hepatosplenomegaly, no masses and bowel sounds normal  MS: no gross musculoskeletal defects noted, no edema  SKIN: no suspicious lesions or rashes  NEURO: Normal strength and tone, mentation intact and speech normal  PSYCH: mentation appears normal, affect normal/bright        Signed Electronically by: Abelardo Frey MD  "